# Patient Record
Sex: MALE | Race: WHITE | HISPANIC OR LATINO | Employment: FULL TIME | URBAN - METROPOLITAN AREA
[De-identification: names, ages, dates, MRNs, and addresses within clinical notes are randomized per-mention and may not be internally consistent; named-entity substitution may affect disease eponyms.]

---

## 2018-07-15 ENCOUNTER — APPOINTMENT (OUTPATIENT)
Dept: CT IMAGING | Facility: CLINIC | Age: 23
End: 2018-07-15
Attending: EMERGENCY MEDICINE

## 2018-07-15 ENCOUNTER — HOSPITAL ENCOUNTER (EMERGENCY)
Facility: CLINIC | Age: 23
Discharge: HOME | End: 2018-07-15
Attending: EMERGENCY MEDICINE | Admitting: EMERGENCY MEDICINE

## 2018-07-15 VITALS
TEMPERATURE: 98.4 F | SYSTOLIC BLOOD PRESSURE: 152 MMHG | RESPIRATION RATE: 18 BRPM | OXYGEN SATURATION: 94 % | DIASTOLIC BLOOD PRESSURE: 92 MMHG

## 2018-07-15 DIAGNOSIS — R19.7 DIARRHEA, UNSPECIFIED TYPE: ICD-10-CM

## 2018-07-15 DIAGNOSIS — K52.9 COLITIS: ICD-10-CM

## 2018-07-15 DIAGNOSIS — R10.84 ABDOMINAL PAIN, GENERALIZED: ICD-10-CM

## 2018-07-15 LAB
ALBUMIN SERPL-MCNC: 3.7 G/DL (ref 3.4–5)
ALBUMIN UR-MCNC: NEGATIVE MG/DL
ALP SERPL-CCNC: 92 U/L (ref 40–150)
ALT SERPL W P-5'-P-CCNC: 102 U/L (ref 0–70)
ANION GAP SERPL CALCULATED.3IONS-SCNC: 6 MMOL/L (ref 3–14)
APPEARANCE UR: CLEAR
AST SERPL W P-5'-P-CCNC: 56 U/L (ref 0–45)
BACTERIA #/AREA URNS HPF: ABNORMAL /HPF
BASOPHILS # BLD AUTO: 0 10E9/L (ref 0–0.2)
BASOPHILS NFR BLD AUTO: 0.2 %
BILIRUB SERPL-MCNC: 0.9 MG/DL (ref 0.2–1.3)
BILIRUB UR QL STRIP: NEGATIVE
BUN SERPL-MCNC: 9 MG/DL (ref 7–30)
C COLI+JEJUNI+LARI FUSA STL QL NAA+PROBE: NOT DETECTED
CALCIUM SERPL-MCNC: 8.8 MG/DL (ref 8.5–10.1)
CHLORIDE SERPL-SCNC: 107 MMOL/L (ref 94–109)
CO2 SERPL-SCNC: 26 MMOL/L (ref 20–32)
COLOR UR AUTO: YELLOW
CREAT SERPL-MCNC: 0.89 MG/DL (ref 0.66–1.25)
DIFFERENTIAL METHOD BLD: NORMAL
EC STX1 GENE STL QL NAA+PROBE: NOT DETECTED
EC STX2 GENE STL QL NAA+PROBE: NOT DETECTED
ENTERIC PATHOGEN COMMENT: NORMAL
EOSINOPHIL # BLD AUTO: 0.1 10E9/L (ref 0–0.7)
EOSINOPHIL NFR BLD AUTO: 0.8 %
ERYTHROCYTE [DISTWIDTH] IN BLOOD BY AUTOMATED COUNT: 12.2 % (ref 10–15)
GFR SERPL CREATININE-BSD FRML MDRD: >90 ML/MIN/1.7M2
GLUCOSE SERPL-MCNC: 107 MG/DL (ref 70–99)
GLUCOSE UR STRIP-MCNC: NEGATIVE MG/DL
HCT VFR BLD AUTO: 47.6 % (ref 40–53)
HGB BLD-MCNC: 15.1 G/DL (ref 13.3–17.7)
HGB UR QL STRIP: NEGATIVE
IMM GRANULOCYTES # BLD: 0 10E9/L (ref 0–0.4)
IMM GRANULOCYTES NFR BLD: 0.3 %
KETONES UR STRIP-MCNC: NEGATIVE MG/DL
LEUKOCYTE ESTERASE UR QL STRIP: NEGATIVE
LIPASE SERPL-CCNC: 81 U/L (ref 73–393)
LYMPHOCYTES # BLD AUTO: 1.1 10E9/L (ref 0.8–5.3)
LYMPHOCYTES NFR BLD AUTO: 13.1 %
MCH RBC QN AUTO: 28.8 PG (ref 26.5–33)
MCHC RBC AUTO-ENTMCNC: 31.7 G/DL (ref 31.5–36.5)
MCV RBC AUTO: 91 FL (ref 78–100)
MONOCYTES # BLD AUTO: 0.6 10E9/L (ref 0–1.3)
MONOCYTES NFR BLD AUTO: 7.2 %
MUCOUS THREADS #/AREA URNS LPF: PRESENT /LPF
NEUTROPHILS # BLD AUTO: 6.8 10E9/L (ref 1.6–8.3)
NEUTROPHILS NFR BLD AUTO: 78.4 %
NITRATE UR QL: NEGATIVE
NOROV GI+II ORF1-ORF2 JNC STL QL NAA+PR: NOT DETECTED
NRBC # BLD AUTO: 0 10*3/UL
NRBC BLD AUTO-RTO: 0 /100
PH UR STRIP: 5 PH (ref 5–7)
PLATELET # BLD AUTO: 321 10E9/L (ref 150–450)
POTASSIUM SERPL-SCNC: 3.8 MMOL/L (ref 3.4–5.3)
PROT SERPL-MCNC: 8.2 G/DL (ref 6.8–8.8)
RBC # BLD AUTO: 5.25 10E12/L (ref 4.4–5.9)
RBC #/AREA URNS AUTO: 1 /HPF (ref 0–2)
RVA NSP5 STL QL NAA+PROBE: NOT DETECTED
SALMONELLA SP RPOD STL QL NAA+PROBE: NOT DETECTED
SHIGELLA SP+EIEC IPAH STL QL NAA+PROBE: NOT DETECTED
SODIUM SERPL-SCNC: 139 MMOL/L (ref 133–144)
SOURCE: ABNORMAL
SP GR UR STRIP: 1 (ref 1–1.03)
UROBILINOGEN UR STRIP-MCNC: 0 MG/DL (ref 0–2)
V CHOL+PARA RFBL+TRKH+TNAA STL QL NAA+PR: NOT DETECTED
WBC # BLD AUTO: 8.7 10E9/L (ref 4–11)
WBC #/AREA URNS AUTO: <1 /HPF (ref 0–5)
Y ENTERO RECN STL QL NAA+PROBE: NOT DETECTED

## 2018-07-15 PROCEDURE — 96374 THER/PROPH/DIAG INJ IV PUSH: CPT | Mod: 59

## 2018-07-15 PROCEDURE — 83690 ASSAY OF LIPASE: CPT | Performed by: EMERGENCY MEDICINE

## 2018-07-15 PROCEDURE — 74177 CT ABD & PELVIS W/CONTRAST: CPT

## 2018-07-15 PROCEDURE — 87506 IADNA-DNA/RNA PROBE TQ 6-11: CPT | Performed by: EMERGENCY MEDICINE

## 2018-07-15 PROCEDURE — 96375 TX/PRO/DX INJ NEW DRUG ADDON: CPT

## 2018-07-15 PROCEDURE — 96361 HYDRATE IV INFUSION ADD-ON: CPT

## 2018-07-15 PROCEDURE — 81001 URINALYSIS AUTO W/SCOPE: CPT | Performed by: EMERGENCY MEDICINE

## 2018-07-15 PROCEDURE — 25000128 H RX IP 250 OP 636: Performed by: EMERGENCY MEDICINE

## 2018-07-15 PROCEDURE — 80053 COMPREHEN METABOLIC PANEL: CPT | Performed by: EMERGENCY MEDICINE

## 2018-07-15 PROCEDURE — 85025 COMPLETE CBC W/AUTO DIFF WBC: CPT | Performed by: EMERGENCY MEDICINE

## 2018-07-15 PROCEDURE — 99285 EMERGENCY DEPT VISIT HI MDM: CPT | Mod: 25

## 2018-07-15 RX ORDER — IOPAMIDOL 755 MG/ML
500 INJECTION, SOLUTION INTRAVASCULAR ONCE
Status: COMPLETED | OUTPATIENT
Start: 2018-07-15 | End: 2018-07-15

## 2018-07-15 RX ORDER — ONDANSETRON 4 MG/1
4 TABLET, ORALLY DISINTEGRATING ORAL EVERY 8 HOURS PRN
Qty: 10 TABLET | Refills: 0 | Status: SHIPPED | OUTPATIENT
Start: 2018-07-15 | End: 2018-07-18

## 2018-07-15 RX ORDER — ONDANSETRON 2 MG/ML
4 INJECTION INTRAMUSCULAR; INTRAVENOUS ONCE
Status: COMPLETED | OUTPATIENT
Start: 2018-07-15 | End: 2018-07-15

## 2018-07-15 RX ORDER — OXYCODONE HYDROCHLORIDE 5 MG/1
5 TABLET ORAL EVERY 6 HOURS PRN
Qty: 12 TABLET | Refills: 0 | Status: SHIPPED | OUTPATIENT
Start: 2018-07-15 | End: 2022-09-15

## 2018-07-15 RX ORDER — HYDROMORPHONE HYDROCHLORIDE 1 MG/ML
0.5 INJECTION, SOLUTION INTRAMUSCULAR; INTRAVENOUS; SUBCUTANEOUS ONCE
Status: COMPLETED | OUTPATIENT
Start: 2018-07-15 | End: 2018-07-15

## 2018-07-15 RX ADMIN — ONDANSETRON 4 MG: 2 INJECTION, SOLUTION INTRAMUSCULAR; INTRAVENOUS at 10:50

## 2018-07-15 RX ADMIN — SODIUM CHLORIDE 55 ML: 900 INJECTION, SOLUTION INTRAVENOUS at 11:54

## 2018-07-15 RX ADMIN — IOPAMIDOL 100 ML: 755 INJECTION, SOLUTION INTRAVENOUS at 11:49

## 2018-07-15 RX ADMIN — SODIUM CHLORIDE 1000 ML: 9 INJECTION, SOLUTION INTRAVENOUS at 10:51

## 2018-07-15 RX ADMIN — Medication 0.5 MG: at 10:51

## 2018-07-15 ASSESSMENT — ENCOUNTER SYMPTOMS
HEMATURIA: 0
VOMITING: 0
DYSURIA: 0
DIARRHEA: 1
ABDOMINAL PAIN: 1
BLOOD IN STOOL: 0
NAUSEA: 1

## 2018-07-15 NOTE — ED TRIAGE NOTES
Diarrhea for the past three days about one time an hour. Also abdominal pain that was more yesterday. Chills on the first day of his illness but none since. Did not measure a fever.

## 2018-07-15 NOTE — ED NOTES
Pt states he had BM when giving UA.  States diarrhea was watery - pt drank a lg cup of water, THEN had the urge to have a BM.

## 2018-07-15 NOTE — ED PROVIDER NOTES
History     Chief Complaint:  Nausea, diarrhea, and abdominal pain     HPI   Kar Bower Jr. is a 23 year old male who presents with nausea, diarrhea, and abdominal pain.  Patient says that starting 3 days ago he began having diarrhea every hour, nausea, and abdominal pain.  Says he felt feverish as well but has no known fever.  Here in the ED, patient denies any vomiting, recent camping, recent travel outside of the country, dysuria, hematuria, blood in stool, symptoms like this in the past, recent antibiotic use, history of abdominal surgery, or any pain medications taken prior to arrival today.    Allergies:  Sulfa     Medications:    The patient is currently on no regular medications.      Past Medical History:    The patient denies any significant past medical history.    Past Surgical History:    The patient does not have any pertinent past surgical history  Family / Social History:    No past pertinent family history.     Social History:  Smoking Status: Current Smoker  Alcohol Use: Yes  Patient presents alone.    Review of Systems   Gastrointestinal: Positive for abdominal pain, diarrhea and nausea. Negative for blood in stool and vomiting.   Genitourinary: Negative for dysuria and hematuria.   All other systems reviewed and are negative.      Physical Exam   First Vitals:  BP: (!) 162/105  Heart Rate: 123  Temp: 98.4  F (36.9  C)  Resp: 18  SpO2: 100 %      Physical Exam  Constitutional: The patient is oriented to person, place, and time. Alert and cooperative.  HENT:   Right Ear: External ear normal.   Left Ear: External ear normal.   Nose: Nose normal.   Mouth/Throat: Moist mucous membranes.   Eyes: Conjunctivae, EOM and lids are normal.   Neck: Trachea normal. Normal range of motion. Neck supple.   Cardiovascular: tachycardia, regular rhythm, normal heart sounds, and intact distal pulses.    Pulmonary/Chest: Effort normal and breath sounds equal bilaterally. No crackles or wheezing.   Abdominal: Soft.  Diffuse tenderness to palpation across the lower abdomen. No rebound and no guarding.   Musculoskeletal: Normal range of motion.  No extremity tenderness or edema.  Neurological: Alert and Oriented. Moves all extremities equally.   Skin: Skin is dry. No rash noted.        Emergency Department Course     Imaging:  Radiographic findings were communicated with the patient who voiced understanding of the findings.  CT Abdomen/Pelvis with IV contrast:   1. Findings consistent with pan colitis. No evidence of bowel  obstruction or abscess.  2. Mild mesenteric lymphadenopathy.  3. Fatty liver.   As per radiology.    Laboratory:  Enteric Bacteria and Virus Panel by [facility] stool: In process  UA with micro: bacteria few, mucous urine present o/w negative    CBC: WBC: 8.7, HGB: 15.1, PLT: 321  CMP: Glucose 107 H,  H, AST 56 H, o/w WNL (Creatinine: 0.89)    Lipase: 81    Interventions:  1050 Zofran 4 mg IV  1051 Dilaudid 0.5 mg IV  1051 NS 1L IV    Emergency Department Course:  Nursing notes and vitals reviewed.  I performed an exam of the patient as documented above.     IV inserted. Medicine administered as documented above. Blood drawn. This was sent to the lab for further testing, results above.    The patient was sent for a CT while in the emergency department, findings above.     The patient tolerated PO challenge without difficulty here in the emergency department.     I rechecked the patient and discussed the results of his workup thus far.     Findings and plan explained to the Patient. Patient discharged home with instructions regarding supportive care, medications, and reasons to return. The importance of close follow-up was reviewed. The patient was prescribed Roxicodone and Zofran.    I personally reviewed the laboratory results with the Patient and answered all related questions prior to discharge.     Impression & Plan      Medical Decision Making:  Kar Jaureguidinesh Sue is a 23 year old male who presents with  nausea, diarrhea, and abdominal pain.  Upon presentation in the ED, the patient is nontoxic-appearing.  He is hypertensive and tachycardic, but vitals are otherwise within normal limits and stable.  On exam, he is well-appearing.  He is alert, oriented, and neurologic exam is nonfocal.  Cardiopulmonary exam is unremarkable.  On abdominal examination, he does endorse mild diffuse lower abdominal tenderness to palpation.  There is no rebound or guarding.  The rest of his exam is as mentioned above.    Labs were obtained and are as mentioned above.  Notably, he does not have a leukocytosis.  He does have mildly elevated liver function tests.  He has no tenderness to palpation in the right upper quadrant, therefore I have low suspicion for an acute gallbladder or hepatic pathology.  Therefore, I do not feel that right upper quadrant ultrasound is indicated at this time.  CT of the abdomen was obtained and demonstrates findings consistent with pancolitis.  There is no evidence for bowel obstruction or abscess.  These results were discussed with the patient and he notes understanding.  Given this patient's history and presentation, I do feel his symptoms are most consistent with a viral GI illness.  Given that he is afebrile, denies hematochezia, and does not have a leukocytosis, I have very low suspicion for an invasive bacterial etiology of his symptoms and do not feel that antibiotics are indicated at this time. The patient was able to provide a stool sample while in the emergency department.  The stool culture is in process.  If this is abnormal, patient will be contacted and treated at that time.  Upon my repeat evaluation, he does note improvement in his symptoms and denies any pain at this time.  He was given a trial of PO and tolerated this well.  Given that the patient is well-appearing here and tolerating PO, I do feel he is safe for discharge to home.  I did discuss with the patient that I recommend close  follow-up with a primary care physician and he notes understanding and agreement with this plan.  Symptomatic management was discussed.  Return instructions were given.  He was stable/improved at time discharge.    Diagnosis:    ICD-10-CM    1. Diarrhea, unspecified type R19.7 Enteric Bacteria and Virus Panel by [facility] Stool     UA with Microscopic   2. Colitis K52.9    3. Abdominal pain, generalized R10.84        Disposition:  discharged to home    Discharge Medications:  Discharge Medication List as of 7/15/2018  3:07 PM      START taking these medications    Details   ondansetron (ZOFRAN ODT) 4 MG ODT tab Take 1 tablet (4 mg) by mouth every 8 hours as needed, Disp-10 tablet, R-0, Local Print      oxyCODONE IR (ROXICODONE) 5 MG tablet Take 1 tablet (5 mg) by mouth every 6 hours as needed for pain, Disp-12 tablet, R-0, Local Print               Tai Hayes  7/15/2018   [facility] EMERGENCY DEPARTMENT  ITai, kirill serving as a scribe at 10:12 AM on 7/15/2018 to document services personally performed by Elba Cardoso MD based on my observations and the provider's statements to me.        Elba Cardoso MD  07/15/18 1635       Elba Cardoso MD  07/15/18 1638       Elba Cardoso MD  07/15/18 1637

## 2018-07-15 NOTE — ED AVS SNAPSHOT
[facility] Emergency Department    [address]    [facility] [address]    Phone:  [phone]    Fax:  [phone]                                       Kar Bower Jr.   MRN: [id number]    Department:  [facility] Emergency Department   Date of Visit:  7/15/2018           Patient Information     Date Of Birth          1995        Your diagnoses for this visit were:     Diarrhea, unspecified type     Colitis     Abdominal pain, generalized        You were seen by Elba Cardoso MD.      Follow-up Information     Schedule an appointment as soon as possible for a visit with [facility].    Specialties:  Sports Medicine, Pain Management, Obstetrics & Gynecology, Pediatrics, Internal Medicine, Nephrology    Contact information:    [address] [place] [address]  [facility] [address] [phone]        Discharge Instructions       Please follow up closely with your regular physician.     Please return to the ED if your symptoms worsen or if you develop new or concerning symptoms.     Discharge Instructions  Abdominal Pain    Abdominal pain can be caused by many things. Your evaluation today does not show the exact cause for your pain. Your doctor today has decided that it is unlikely your pain is due to a life threatening problem, or a problem requiring surgery or hospital admission. Sometimes those problems cannot be found right away, so it is very important that you follow up as directed.  Sometimes only the changes which occur over time allow the cause of your pain to be found.    Return to the Emergency Department for a recheck in 8-12 hours if your pain continues.  If your pain gets worse, changes in location, or feels different, return to the Emergency Department right away.    ADULTS:  Return to the Emergency Department right away if:      You get an oral temperature above 102oF or as directed by your doctor.    You have blood  in your stools (bright red or black, tarry stools).    You keep throwing up or can t drink liquids.    You see blood when you throw up.    You can t have a bowel movement or you can t pass gas.    Your stomach gets bloated or bigger.    Your skin or the whites of your eyes look yellow.    You faint.    You have bloody, frequent or painful urination.    You have new symptoms or anything that worries you.    CHILDREN:  Return to the Emergency Department right away if your child has any of the above-listed symptoms or the following:      Pushes your hand away or screams/cries when his/her belly is touched.    You notice your child is very fussy or weak.    Your child is very tired and is too tired to eat or drink.    Your child is dehydrated.  Signs of dehydration can be:  o Your infant has had no wet diapers in 4-5 hours.  o Your older child has not passed urine in 6-8 hours.  o Your infant or child starts to have dry mouth and lips, or no saliva or tears.    PREGNANT WOMEN:  Return to the Emergency Department right away if you have any of the above-listed symptoms or the following:      You have bleeding, leaking fluid or passing tissue from the vagina.    You have worse pain or cramping, or pain in your shoulder or back.    You have vomiting that will not stop.    You have painful or bloody urination.    You have a temperature of 100oF or more.    Your baby is not moving as much as usual.    You faint.    You get a bad headache with or without eye problems and abdominal pain.    You have a convulsion or seizure.    You have unusual discharge from your vagina and abdominal pain.    Abdominal pain is pretty common during pregnancy.  Your pain may or may not be related to your pregnancy. You should follow-up closely with your OB doctor so they can evaluate you and your baby.  Until you follow-up with your regular doctor, do the following:       Avoid sex and do not put anything in your vagina.    Drink clear  "fluids.    Only take medications approved by your doctor.    MORE INFORMATION:    Appendicitis:  A possible cause of abdominal pain in any person who still has their appendix is acute appendicitis. Appendicitis is often hard to diagnose.  Testing does not always rule out early appendicitis or other causes of abdominal pain. Close follow-up with your doctor and re-evaluations may be needed to figure out the reason for your abdominal pain.    Follow-up:  It is very important that you make an appointment with your clinic and go to the appointment.  If you do not follow-up with your primary doctor, it may result in missing an important development which could result in permanent injury or disability and/or lasting pain.  If there is any problem keeping your appointment, call your doctor or return to the Emergency Department.    Medications:  Take your medications as directed by your doctor today.  Before using over-the-counter medications, ask your doctor and make sure to take the medications as directed.  If you have any questions about medications, ask your doctor.    Diet:  Resume your normal diet as much as possible, but do not eat fried, fatty or spicy foods while you have pain.  Do not drink alcohol or have caffeine.  Do not smoke tobacco.    Probiotics: If you have been given an antibiotic, you may want to also take a probiotic pill or eat yogurt with live cultures. Probiotics have \"good bacteria\" to help your intestines stay healthy. Studies have shown that probiotics help prevent diarrhea and other intestine problems (including C. diff infection) when you take antibiotics. You can buy these without a prescription in the pharmacy section of the store.     If you were given a prescription for medicine here today, be sure to read all of the information (including the package insert) that comes with your prescription.  This will include important information about the medicine, its side effects, and any warnings " that you need to know about.  The pharmacist who fills the prescription can provide more information and answer questions you may have about the medicine.  If you have questions or concerns that the pharmacist cannot address, please call or return to the Emergency Department.         Opioid Medication Information    Pain medications are among the most commonly prescribed medicines, so we are including this information for all our patients. If you did not receive pain medication or get a prescription for pain medicine, you can ignore it.     You may have been given a prescription for an opioid (narcotic) pain medicine and/or have received a pain medicine while here in the Emergency Department. These medicines can make you drowsy or impaired. You must not drive, operate dangerous equipment, or engage in any other dangerous activities while taking these medications. If you drive while taking these medications, you could be arrested for DUI, or driving under the influence. Do not drink any alcohol while you are taking these medications.     Opioid pain medications can cause addiction. If you have a history of chemical dependency of any type, you are at a higher risk of becoming addicted to pain medications.  Only take these prescribed medications to treat your pain when all other options have been tried. Take it for as short a time and as few doses as possible. Store your pain pills in a secure place, as they are frequently stolen and provide a dangerous opportunity for children or visitors in your house to start abusing these powerful medications. We will not replace any lost or stolen medicine.  As soon as your pain is better, you should flush all your remaining medication.     Many prescription pain medications contain Tylenol  (acetaminophen), including Vicodin , Tylenol #3 , Norco , Lortab , and Percocet .  You should not take any extra pills of Tylenol  if you are using these prescription medications or you can  get very sick.  Do not ever take more than 3000 mg of acetaminophen in any 24 hour period.    All opioids tend to cause constipation. Drink plenty of water and eat foods that have a lot of fiber, such as fruits, vegetables, prune juice, apple juice and high fiber cereal.  Take a laxative if you don t move your bowels at least every other day. Miralax , Milk of Magnesia, Colace , or Senna  can be used to keep you regular.      Remember that you can always come back to the Emergency Department if you are not able to see your regular doctor in the amount of time listed above, if you get any new symptoms, or if there is anything that worries you.      Discharge Instructions  Adult Diarrhea    You have been seen today for diarrhea. This is usually caused by a virus, but some bacteria, parasites, medicines or other medical conditions can cause similar symptoms. At this time your doctor does not find that your diarrhea is a sign of anything dangerous or life-threatening. However, sometimes the signs of serious illness do not show up right away. If you have new or worse symptoms, you may need to be seen again in the Emergency Department or by your primary doctor.     Return to the Emergency Department if:    You feel you are getting dehydrated, such as being very thirsty, not urinating at least every 8-12 hours, or feeling faint or lightheaded.     You develop a new fever, or your fever continues for more than 2 days.     You have belly pain that seems worse than cramps, is in one spot, or is getting worse over time.     You have blood in your stool or your stool becomes black.  (Remember that if you take Pepto-Bismol , this will turn your stool black).     You feel very weak.    You are not starting to improve within 24 hours of your visit here.    What can I do to help myself?    The most important thing to do is to drink clear liquids.   It is best to have only small, frequent sips of liquids. Drinking too much at once  "may cause more diarrhea. You should also replace minerals, sodium and potassium lost with diarrhea. Pedialyte  and sports drinks can help you replace these minerals. You can also drink clear liquids such as water, weak tea, apple juice, and 7-Up . Avoid acid liquids (orange), caffeine (coffee) or alcohol. Milk products will make the diarrhea worse.     Eat only bland foods. Soda crackers, toast, plain noodles, gelatin, applesauce and bananas are good first choices. Avoid foods that have acid, are spicy, fatty or fibrous (such as meats, coarse grains, vegetables). You may start eating these foods again in about 3 days when you are better.     Sometimes treatment includes prescription medicine to prevent diarrhea. If your doctor prescribes these for you, take them as directed.     Nonprescription medicine is available for the treatment of diarrhea and can be very effective. If you use it, make sure you use the dose recommended on the package. Check with your healthcare provider before you use any medicine for diarrhea.     Don t take ibuprofen, or other nonsteroidal anti-inflammatory medicines without checking with your healthcare provider.   Probiotics: If you have been given an antibiotic, you may want to also take a probiotic pill or eat yogurt with live cultures. Probiotics have \"good bacteria\" to help your intestines stay healthy. Studies have shown that probiotics help prevent diarrhea and other intestine problems (including C. diff infection) when you take antibiotics. You can buy these without a prescription in the pharmacy section of the store.   If you were given a prescription for medicine here today, be sure to read all of the information (including the package insert) that comes with your prescription.  This will include important information about the medicine, its side effects, and any warnings that you need to know about.  The pharmacist who fills the prescription can provide more information and " answer questions you may have about the medicine.  If you have questions or concerns that the pharmacist cannot address, please call or return to the Emergency Department.   Opioid Medication Information    Pain medications are among the most commonly prescribed medicines, so we are including this information for all our patients. If you did not receive pain medication or get a prescription for pain medicine, you can ignore it.     You may have been given a prescription for an opioid (narcotic) pain medicine and/or have received a pain medicine while here in the Emergency Department. These medicines can make you drowsy or impaired. You must not drive, operate dangerous equipment, or engage in any other dangerous activities while taking these medications. If you drive while taking these medications, you could be arrested for DUI, or driving under the influence. Do not drink any alcohol while you are taking these medications.     Opioid pain medications can cause addiction. If you have a history of chemical dependency of any type, you are at a higher risk of becoming addicted to pain medications.  Only take these prescribed medications to treat your pain when all other options have been tried. Take it for as short a time and as few doses as possible. Store your pain pills in a secure place, as they are frequently stolen and provide a dangerous opportunity for children or visitors in your house to start abusing these powerful medications. We will not replace any lost or stolen medicine.  As soon as your pain is better, you should flush all your remaining medication.     Many prescription pain medications contain Tylenol  (acetaminophen), including Vicodin , Tylenol #3 , Norco , Lortab , and Percocet .  You should not take any extra pills of Tylenol  if you are using these prescription medications or you can get very sick.  Do not ever take more than 3000 mg of acetaminophen in any 24 hour period.    All opioids tend to  cause constipation. Drink plenty of water and eat foods that have a lot of fiber, such as fruits, vegetables, prune juice, apple juice and high fiber cereal.  Take a laxative if you don t move your bowels at least every other day. Miralax , Milk of Magnesia, Colace , or Senna  can be used to keep you regular.      Remember that you can always come back to the Emergency Department if you are not able to see your regular doctor in the amount of time listed above, if you get any new symptoms, or if there is anything that worries you.        24 Hour Appointment Hotline       To make an appointment at any [facility], call [phone] ([phone]). If you don't have a family doctor or clinic, we will help you find one. [facility] are conveniently located to serve the needs of you and your family.             Review of your medicines      START taking        Dose / Directions Last dose taken    ondansetron 4 MG ODT tab   Commonly known as:  ZOFRAN ODT   Dose:  4 mg   Quantity:  10 tablet        Take 1 tablet (4 mg) by mouth every 8 hours as needed   Refills:  0        oxyCODONE IR 5 MG tablet   Commonly known as:  ROXICODONE   Dose:  5 mg   Quantity:  12 tablet        Take 1 tablet (5 mg) by mouth every 6 hours as needed for pain   Refills:  0          Our records show that you are taking the medicines listed below. If these are incorrect, please call your family doctor or clinic.        Dose / Directions Last dose taken    benzonatate 200 MG capsule   Commonly known as:  TESSALON   Dose:  200 mg   Quantity:  21 capsule        Take 1 capsule (200 mg) by mouth 3 times daily as needed for cough   Refills:  0                Information about OPIOIDS     PRESCRIPTION OPIOIDS: WHAT YOU NEED TO KNOW   We gave you an opioid (narcotic) pain medicine. It is important to manage your pain, but opioids are not always the best choice. You should first try all the other options your care team gave you. Take this  medicine for as short a time (and as few doses) as possible.     These medicines have risks:    DO NOT drive when on new or higher doses of pain medicine. These medicines can affect your alertness and reaction times, and you could be arrested for driving under the influence (DUI). If you need to use opioids long-term, talk to your care team about driving.    DO NOT operate heave machinery    DO NOT do any other dangerous activities while taking these medicines.     DO NOT drink any alcohol while taking these medicines.      If the opioid prescribed includes acetaminophen, DO NOT take with any other medicines that contain acetaminophen. Read all labels carefully. Look for the word  acetaminophen  or  Tylenol.  Ask your pharmacist if you have questions or are unsure.    You can get addicted to pain medicines, especially if you have a history of addiction (chemical, alcohol or substance dependence). Talk to your care team about ways to reduce this risk.    Store your pills in a secure place, locked if possible. We will not replace any lost or stolen medicine. If you don t finish your medicine, please throw away (dispose) as directed by your pharmacist. The [place] [employer] has more information about safe disposal: https://www.pca.[facility].[place].us/living-green/managing-unwanted-medications.     All opioids tend to cause constipation. Drink plenty of water and eat foods that have a lot of fiber, such as fruits, vegetables, prune juice, apple juice and high-fiber cereal. Take a laxative (Miralax, milk of magnesia, Colace, Senna) if you don t move your bowels at least every other day.         Prescriptions were sent or printed at these locations (2 Prescriptions)                   Other Prescriptions                Printed at Department/Unit printer (2 of 2)         ondansetron (ZOFRAN ODT) 4 MG ODT tab               oxyCODONE IR (ROXICODONE) 5 MG tablet                Procedures and tests performed during  your visit     CBC + differential    CT Abdomen Pelvis w Contrast    Comprehensive metabolic panel    Enteric Bacteria and Virus Panel by THUY Stool    Lipase    UA with Microscopic      Orders Needing Specimen Collection     None      Pending Results     Date and Time Order Name Status Description    7/15/2018 1017 Enteric Bacteria and Virus Panel by THUY Stool In process             Pending Culture Results     Date and Time Order Name Status Description    7/15/2018 1017 Enteric Bacteria and Virus Panel by THUY Stool In process             Pending Results Instructions     If you had any lab results that were not finalized at the time of your Discharge, you can call the ED Lab Result RN at [phone]. You will be contacted by this team for any positive Lab results or changes in treatment. The nurses are available 7 days a week from 10A to 6:30P.  You can leave a message 24 hours per day and they will return your call.        Test Results From Your Hospital Stay        7/15/2018 11:03 AM      Component Results     Component Value Ref Range & Units Status    WBC 8.7 4.0 - 11.0 10e9/L Final    RBC Count 5.25 4.4 - 5.9 10e12/L Final    Hemoglobin 15.1 13.3 - 17.7 g/dL Final    Hematocrit 47.6 40.0 - 53.0 % Final    MCV 91 78 - 100 fl Final    MCH 28.8 26.5 - 33.0 pg Final    MCHC 31.7 31.5 - 36.5 g/dL Final    RDW 12.2 10.0 - 15.0 % Final    Platelet Count 321 150 - 450 10e9/L Final    Diff Method Automated Method  Final    % Neutrophils 78.4 % Final    % Lymphocytes 13.1 % Final    % Monocytes 7.2 % Final    % Eosinophils 0.8 % Final    % Basophils 0.2 % Final    % Immature Granulocytes 0.3 % Final    Nucleated RBCs 0 0 /100 Final    Absolute Neutrophil 6.8 1.6 - 8.3 10e9/L Final    Absolute Lymphocytes 1.1 0.8 - 5.3 10e9/L Final    Absolute Monocytes 0.6 0.0 - 1.3 10e9/L Final    Absolute Eosinophils 0.1 0.0 - 0.7 10e9/L Final    Absolute Basophils 0.0 0.0 - 0.2 10e9/L Final    Abs Immature Granulocytes 0.0 0 - 0.4  10e9/L Final    Absolute Nucleated RBC 0.0  Final         7/15/2018 11:22 AM      Component Results     Component Value Ref Range & Units Status    Sodium 139 133 - 144 mmol/L Final    Potassium 3.8 3.4 - 5.3 mmol/L Final    Chloride 107 94 - 109 mmol/L Final    Carbon Dioxide 26 20 - 32 mmol/L Final    Anion Gap 6 3 - 14 mmol/L Final    Glucose 107 (H) 70 - 99 mg/dL Final    Urea Nitrogen 9 7 - 30 mg/dL Final    Creatinine 0.89 0.66 - 1.25 mg/dL Final    GFR Estimate >90 >60 mL/min/1.7m2 Final    Non  GFR Calc    GFR Estimate If Black >90 >60 mL/min/1.7m2 Final    African American GFR Calc    Calcium 8.8 8.5 - 10.1 mg/dL Final    Bilirubin Total 0.9 0.2 - 1.3 mg/dL Final    Albumin 3.7 3.4 - 5.0 g/dL Final    Protein Total 8.2 6.8 - 8.8 g/dL Final    Alkaline Phosphatase 92 40 - 150 U/L Final     (H) 0 - 70 U/L Final    AST 56 (H) 0 - 45 U/L Final         7/15/2018 11:22 AM      Component Results     Component Value Ref Range & Units Status    Lipase 81 73 - 393 U/L Final         7/15/2018  3:03 PM         7/15/2018 12:31 PM      Narrative     CT ABDOMEN AND PELVIS WITH CONTRAST   7/15/2018 11:59 AM     HISTORY: Diffuse lower abdominal pain, diarrhea.     TECHNIQUE: 100 mL Isovue-370. Radiation dose for this scan was reduced  using automated exposure control, adjustment of the mA and/or kV  according to patient size, or iterative reconstruction technique.    COMPARISON: None.    FINDINGS: The lung bases are clear. There is diffuse fatty  infiltration of the liver. The spleen, pancreas, adrenal glands, and  kidneys are unremarkable. There are multiple mildly enlarged  mesenteric lymph nodes. There is mild diffuse colonic wall thickening.  The appendix is normal. Small bowel is normal in caliber. There is no  free intraperitoneal air. No ascites or fluid collections.         Impression     IMPRESSION:   1. Findings consistent with pan colitis. No evidence of bowel  obstruction or abscess.  2.  Mild mesenteric lymphadenopathy.  3. Fatty liver.    ALBA COLES MD         7/15/2018  2:40 PM      Component Results     Component Value Ref Range & Units Status    Color Urine Yellow  Final    Appearance Urine Clear  Final    Glucose Urine Negative NEG^Negative mg/dL Final    Bilirubin Urine Negative NEG^Negative Final    Ketones Urine Negative NEG^Negative mg/dL Final    Specific Gravity Urine 1.005 1.003 - 1.035 Final    Blood Urine Negative NEG^Negative Final    pH Urine 5.0 5.0 - 7.0 pH Final    Protein Albumin Urine Negative NEG^Negative mg/dL Final    Urobilinogen mg/dL 0.0 0.0 - 2.0 mg/dL Final    Nitrite Urine Negative NEG^Negative Final    Leukocyte Esterase Urine Negative NEG^Negative Final    Source Midstream Urine  Final    WBC Urine <1 0 - 5 /HPF Final    RBC Urine 1 0 - 2 /HPF Final    Bacteria Urine Few (A) NEG^Negative /HPF Final    Mucous Urine Present (A) NEG^Negative /LPF Final                Clinical Quality Measure: Blood Pressure Screening     Your blood pressure was checked while you were in the emergency department today. The last reading we obtained was  BP: (!) 152/92 . Please read the guidelines below about what these numbers mean and what you should do about them.  If your systolic blood pressure (the top number) is less than 120 and your diastolic blood pressure (the bottom number) is less than 80, then your blood pressure is normal. There is nothing more that you need to do about it.  If your systolic blood pressure (the top number) is 120-139 or your diastolic blood pressure (the bottom number) is 80-89, your blood pressure may be higher than it should be. You should have your blood pressure rechecked within a year by a primary care provider.  If your systolic blood pressure (the top number) is 140 or greater or your diastolic blood pressure (the bottom number) is 90 or greater, you may have high blood pressure. High blood pressure is treatable, but if left untreated over time it  "can put you at risk for heart attack, stroke, or kidney failure. You should have your blood pressure rechecked by a primary care provider within the next 4 weeks.  If your provider in the emergency department today gave you specific instructions to follow-up with your doctor or provider even sooner than that, you should follow that instruction and not wait for up to 4 weeks for your follow-up visit.        Thank you for choosing [facility]       Thank you for choosing [facility] for your care. Our goal is always to provide you with excellent care. Hearing back from our patients is one way we can continue to improve our services. Please take a few minutes to complete the written survey that you may receive in the mail after you visit with us. Thank you!        [employer] Information     [email or web address] lets you send messages to your doctor, view your test results, renew your prescriptions, schedule appointments and more. To sign up, go to [email or web address] . Click on \"Log in\" on the left side of the screen, which will take you to the Welcome page. Then click on \"Sign up Now\" on the right side of the page.     You will be asked to enter the access code listed below, as well as some personal information. Please follow the directions to create your username and password.     Your access code is: [email or web address]  Expires: 10/13/2018  3:06 PM     Your access code will  in 90 days. If you need help or a new code, please call your [facility] clinic or [phone].        Care EveryWhere ID     This is your Care EveryWhere ID. This could be used by other organizations to access your [facility] medical records  [id number]        Equal Access to Services     [facility] : Hadii meseret Echavarria, wacarlosda luqlauren, qaybta kaalkevin irby . So [facility] [phone].    ATENCIÓN: Si habla español, tiene a jean baptiste disposición servicios gratuitos de asistencia lingüística. Llame al " [phone].    We comply with applicable federal civil rights laws and [place] laws. We do not discriminate on the basis of race, color, national origin, age, disability, sex, sexual orientation, or gender identity.            After Visit Summary       This is your record. Keep this with you and show to your community pharmacist(s) and doctor(s) at your next visit.

## 2018-07-15 NOTE — DISCHARGE INSTRUCTIONS
Please follow up closely with your regular physician.     Please return to the ED if your symptoms worsen or if you develop new or concerning symptoms.     Discharge Instructions  Abdominal Pain    Abdominal pain can be caused by many things. Your evaluation today does not show the exact cause for your pain. Your doctor today has decided that it is unlikely your pain is due to a life threatening problem, or a problem requiring surgery or hospital admission. Sometimes those problems cannot be found right away, so it is very important that you follow up as directed.  Sometimes only the changes which occur over time allow the cause of your pain to be found.    Return to the Emergency Department for a recheck in 8-12 hours if your pain continues.  If your pain gets worse, changes in location, or feels different, return to the Emergency Department right away.    ADULTS:  Return to the Emergency Department right away if:      You get an oral temperature above 102oF or as directed by your doctor.    You have blood in your stools (bright red or black, tarry stools).    You keep throwing up or can t drink liquids.    You see blood when you throw up.    You can t have a bowel movement or you can t pass gas.    Your stomach gets bloated or bigger.    Your skin or the whites of your eyes look yellow.    You faint.    You have bloody, frequent or painful urination.    You have new symptoms or anything that worries you.    CHILDREN:  Return to the Emergency Department right away if your child has any of the above-listed symptoms or the following:      Pushes your hand away or screams/cries when his/her belly is touched.    You notice your child is very fussy or weak.    Your child is very tired and is too tired to eat or drink.    Your child is dehydrated.  Signs of dehydration can be:  o Your infant has had no wet diapers in 4-5 hours.  o Your older child has not passed urine in 6-8 hours.  o Your infant or child starts to have  dry mouth and lips, or no saliva or tears.    PREGNANT WOMEN:  Return to the Emergency Department right away if you have any of the above-listed symptoms or the following:      You have bleeding, leaking fluid or passing tissue from the vagina.    You have worse pain or cramping, or pain in your shoulder or back.    You have vomiting that will not stop.    You have painful or bloody urination.    You have a temperature of 100oF or more.    Your baby is not moving as much as usual.    You faint.    You get a bad headache with or without eye problems and abdominal pain.    You have a convulsion or seizure.    You have unusual discharge from your vagina and abdominal pain.    Abdominal pain is pretty common during pregnancy.  Your pain may or may not be related to your pregnancy. You should follow-up closely with your OB doctor so they can evaluate you and your baby.  Until you follow-up with your regular doctor, do the following:       Avoid sex and do not put anything in your vagina.    Drink clear fluids.    Only take medications approved by your doctor.    MORE INFORMATION:    Appendicitis:  A possible cause of abdominal pain in any person who still has their appendix is acute appendicitis. Appendicitis is often hard to diagnose.  Testing does not always rule out early appendicitis or other causes of abdominal pain. Close follow-up with your doctor and re-evaluations may be needed to figure out the reason for your abdominal pain.    Follow-up:  It is very important that you make an appointment with your clinic and go to the appointment.  If you do not follow-up with your primary doctor, it may result in missing an important development which could result in permanent injury or disability and/or lasting pain.  If there is any problem keeping your appointment, call your doctor or return to the Emergency Department.    Medications:  Take your medications as directed by your doctor today.  Before using over-the-counter  "medications, ask your doctor and make sure to take the medications as directed.  If you have any questions about medications, ask your doctor.    Diet:  Resume your normal diet as much as possible, but do not eat fried, fatty or spicy foods while you have pain.  Do not drink alcohol or have caffeine.  Do not smoke tobacco.    Probiotics: If you have been given an antibiotic, you may want to also take a probiotic pill or eat yogurt with live cultures. Probiotics have \"good bacteria\" to help your intestines stay healthy. Studies have shown that probiotics help prevent diarrhea and other intestine problems (including C. diff infection) when you take antibiotics. You can buy these without a prescription in the pharmacy section of the store.     If you were given a prescription for medicine here today, be sure to read all of the information (including the package insert) that comes with your prescription.  This will include important information about the medicine, its side effects, and any warnings that you need to know about.  The pharmacist who fills the prescription can provide more information and answer questions you may have about the medicine.  If you have questions or concerns that the pharmacist cannot address, please call or return to the Emergency Department.         Opioid Medication Information    Pain medications are among the most commonly prescribed medicines, so we are including this information for all our patients. If you did not receive pain medication or get a prescription for pain medicine, you can ignore it.     You may have been given a prescription for an opioid (narcotic) pain medicine and/or have received a pain medicine while here in the Emergency Department. These medicines can make you drowsy or impaired. You must not drive, operate dangerous equipment, or engage in any other dangerous activities while taking these medications. If you drive while taking these medications, you could be " arrested for DUI, or driving under the influence. Do not drink any alcohol while you are taking these medications.     Opioid pain medications can cause addiction. If you have a history of chemical dependency of any type, you are at a higher risk of becoming addicted to pain medications.  Only take these prescribed medications to treat your pain when all other options have been tried. Take it for as short a time and as few doses as possible. Store your pain pills in a secure place, as they are frequently stolen and provide a dangerous opportunity for children or visitors in your house to start abusing these powerful medications. We will not replace any lost or stolen medicine.  As soon as your pain is better, you should flush all your remaining medication.     Many prescription pain medications contain Tylenol  (acetaminophen), including Vicodin , Tylenol #3 , Norco , Lortab , and Percocet .  You should not take any extra pills of Tylenol  if you are using these prescription medications or you can get very sick.  Do not ever take more than 3000 mg of acetaminophen in any 24 hour period.    All opioids tend to cause constipation. Drink plenty of water and eat foods that have a lot of fiber, such as fruits, vegetables, prune juice, apple juice and high fiber cereal.  Take a laxative if you don t move your bowels at least every other day. Miralax , Milk of Magnesia, Colace , or Senna  can be used to keep you regular.      Remember that you can always come back to the Emergency Department if you are not able to see your regular doctor in the amount of time listed above, if you get any new symptoms, or if there is anything that worries you.      Discharge Instructions  Adult Diarrhea    You have been seen today for diarrhea. This is usually caused by a virus, but some bacteria, parasites, medicines or other medical conditions can cause similar symptoms. At this time your doctor does not find that your diarrhea is a sign  of anything dangerous or life-threatening. However, sometimes the signs of serious illness do not show up right away. If you have new or worse symptoms, you may need to be seen again in the Emergency Department or by your primary doctor.     Return to the Emergency Department if:    You feel you are getting dehydrated, such as being very thirsty, not urinating at least every 8-12 hours, or feeling faint or lightheaded.     You develop a new fever, or your fever continues for more than 2 days.     You have belly pain that seems worse than cramps, is in one spot, or is getting worse over time.     You have blood in your stool or your stool becomes black.  (Remember that if you take Pepto-Bismol , this will turn your stool black).     You feel very weak.    You are not starting to improve within 24 hours of your visit here.    What can I do to help myself?    The most important thing to do is to drink clear liquids.   It is best to have only small, frequent sips of liquids. Drinking too much at once may cause more diarrhea. You should also replace minerals, sodium and potassium lost with diarrhea. Pedialyte  and sports drinks can help you replace these minerals. You can also drink clear liquids such as water, weak tea, apple juice, and 7-Up . Avoid acid liquids (orange), caffeine (coffee) or alcohol. Milk products will make the diarrhea worse.     Eat only bland foods. Soda crackers, toast, plain noodles, gelatin, applesauce and bananas are good first choices. Avoid foods that have acid, are spicy, fatty or fibrous (such as meats, coarse grains, vegetables). You may start eating these foods again in about 3 days when you are better.     Sometimes treatment includes prescription medicine to prevent diarrhea. If your doctor prescribes these for you, take them as directed.     Nonprescription medicine is available for the treatment of diarrhea and can be very effective. If you use it, make sure you use the dose  "recommended on the package. Check with your healthcare provider before you use any medicine for diarrhea.     Don t take ibuprofen, or other nonsteroidal anti-inflammatory medicines without checking with your healthcare provider.   Probiotics: If you have been given an antibiotic, you may want to also take a probiotic pill or eat yogurt with live cultures. Probiotics have \"good bacteria\" to help your intestines stay healthy. Studies have shown that probiotics help prevent diarrhea and other intestine problems (including C. diff infection) when you take antibiotics. You can buy these without a prescription in the pharmacy section of the store.   If you were given a prescription for medicine here today, be sure to read all of the information (including the package insert) that comes with your prescription.  This will include important information about the medicine, its side effects, and any warnings that you need to know about.  The pharmacist who fills the prescription can provide more information and answer questions you may have about the medicine.  If you have questions or concerns that the pharmacist cannot address, please call or return to the Emergency Department.   Opioid Medication Information    Pain medications are among the most commonly prescribed medicines, so we are including this information for all our patients. If you did not receive pain medication or get a prescription for pain medicine, you can ignore it.     You may have been given a prescription for an opioid (narcotic) pain medicine and/or have received a pain medicine while here in the Emergency Department. These medicines can make you drowsy or impaired. You must not drive, operate dangerous equipment, or engage in any other dangerous activities while taking these medications. If you drive while taking these medications, you could be arrested for DUI, or driving under the influence. Do not drink any alcohol while you are taking these " medications.     Opioid pain medications can cause addiction. If you have a history of chemical dependency of any type, you are at a higher risk of becoming addicted to pain medications.  Only take these prescribed medications to treat your pain when all other options have been tried. Take it for as short a time and as few doses as possible. Store your pain pills in a secure place, as they are frequently stolen and provide a dangerous opportunity for children or visitors in your house to start abusing these powerful medications. We will not replace any lost or stolen medicine.  As soon as your pain is better, you should flush all your remaining medication.     Many prescription pain medications contain Tylenol  (acetaminophen), including Vicodin , Tylenol #3 , Norco , Lortab , and Percocet .  You should not take any extra pills of Tylenol  if you are using these prescription medications or you can get very sick.  Do not ever take more than 3000 mg of acetaminophen in any 24 hour period.    All opioids tend to cause constipation. Drink plenty of water and eat foods that have a lot of fiber, such as fruits, vegetables, prune juice, apple juice and high fiber cereal.  Take a laxative if you don t move your bowels at least every other day. Miralax , Milk of Magnesia, Colace , or Senna  can be used to keep you regular.      Remember that you can always come back to the Emergency Department if you are not able to see your regular doctor in the amount of time listed above, if you get any new symptoms, or if there is anything that worries you.

## 2018-07-15 NOTE — ED AVS SNAPSHOT
[facility] Department    [address]    [facility] [address]    Phone:  [phone]    Fax:  [phone]                                       Kar Bower Jr.   MRN: [id number]    Department:  [facility]   Date of Visit:  7/15/2018           After Visit Summary Signature Page     I have received my discharge instructions, and my questions have been answered. I have discussed any challenges I see with this plan with the nurse or doctor.    ..........................................................................................................................................  Patient/Patient Representative Signature      ..........................................................................................................................................  Patient Representative Print Name and Relationship to Patient    ..................................................               ................................................  Date                                            Time    ..........................................................................................................................................  Reviewed by Signature/Title    ...................................................              ..............................................  Date                                                            Time

## 2019-04-15 ENCOUNTER — HOSPITAL ENCOUNTER (EMERGENCY)
Facility: CLINIC | Age: 24
Discharge: HOME | End: 2019-04-15
Attending: EMERGENCY MEDICINE | Admitting: EMERGENCY MEDICINE

## 2019-04-15 ENCOUNTER — APPOINTMENT (OUTPATIENT)
Dept: GENERAL RADIOLOGY | Facility: CLINIC | Age: 24
End: 2019-04-15
Attending: EMERGENCY MEDICINE

## 2019-04-15 VITALS
DIASTOLIC BLOOD PRESSURE: 100 MMHG | TEMPERATURE: 97.8 F | OXYGEN SATURATION: 96 % | SYSTOLIC BLOOD PRESSURE: 144 MMHG | RESPIRATION RATE: 20 BRPM | BODY MASS INDEX: 46.03 KG/M2 | WEIGHT: 315 LBS | HEART RATE: 102 BPM

## 2019-04-15 DIAGNOSIS — J06.9 ACUTE URI: ICD-10-CM

## 2019-04-15 PROCEDURE — 71046 X-RAY EXAM CHEST 2 VIEWS: CPT

## 2019-04-15 PROCEDURE — 99282 EMERGENCY DEPT VISIT SF MDM: CPT

## 2019-04-15 RX ORDER — DOXYCYCLINE 100 MG/1
100 CAPSULE ORAL 2 TIMES DAILY
Qty: 14 CAPSULE | Refills: 0 | Status: SHIPPED | OUTPATIENT
Start: 2019-04-15 | End: 2019-04-22

## 2019-04-15 RX ORDER — CODEINE PHOSPHATE AND GUAIFENESIN 10; 100 MG/5ML; MG/5ML
2 SOLUTION ORAL EVERY 4 HOURS PRN
Qty: 120 ML | Refills: 0 | Status: SHIPPED | OUTPATIENT
Start: 2019-04-15 | End: 2022-09-15

## 2019-04-15 ASSESSMENT — ENCOUNTER SYMPTOMS
COUGH: 1
CHILLS: 0
WHEEZING: 1
FEVER: 0
SORE THROAT: 1
CHEST TIGHTNESS: 1

## 2019-04-15 NOTE — DISCHARGE INSTRUCTIONS
Start doxycycline in 2-3 days if not improving.    Discharge Instructions  Upper Respiratory Infection    The upper respiratory tract includes the sinuses, nasal passages, pharynx, and larynx. A URI, or upper respiratory infection, is an infection of any of the parts of the upper airway. Symptoms include runny nose, congestion, sneezing, sore throat, cough, and fever. URIs are almost always caused by a virus. Antibiotics do not help with viral infections, so are generally not prescribed. A URI is very contagious through coughing and nasal secretions; make sure you wash your hands often and clean surfaces after sneezing, coughing or touching them. While you should start to improve in 3 - 5 days, remember that sometimes a cough can linger for several weeks.    Generally, every Emergency Department visit should have a follow-up clinic visit with either a primary or a specialty clinic/provider. Please follow-up as instructed by your emergency provider today.    Return to the Emergency Department if:  Any of your symptoms get much worse.  You seem very sick, like being too weak to get up.  You have chest pain or shortness of breath.   You have a severe headache.  You are vomiting (throwing up) so much you cannot keep fluids or medicines down.  You have confusion or seem unusually drowsy.  You have a seizure.    What can I do to help myself?  Fill any prescriptions the provider gave you and take them right away  If you have a fever, get plenty of rest and drink lots of fluids, especially water.  Using a humidifier or saline nose spray will also help loosen mucous.   Clothes or blankets will not change your fever. Do what is comfortable for you.  Bathing or sponging in lukewarm water may help you feel better.  Acetaminophen (Tylenol ) or ibuprofen (Advil , Motrin ) will help bring fever down and may help you feel more comfortable. Be sure to read and follow the package directions, and ask your provider if you have  questions.  Do not drink alcohol.  Decongestants may help you feel better. You may use decongestant nose sprays Afrin  (oxymetazoline) or Castro-Synephrine  (phenylephrine hydrochloride) for up to 3 days, or may use a decongestant tablet like Sudafed  (pseudoephedrine).  If you were given a prescription for medicine here today, be sure to read all of the information (including the package insert) that comes with your prescription.  This will include important information about the medicine, its side effects, and any warnings that you need to know about.  The pharmacist who fills the prescription can provide more information and answer questions you may have about the medicine.  If you have questions or concerns that the pharmacist cannot address, please call or return to the Emergency Department.   Remember that you can always come back to the Emergency Department if you are not able to see your regular provider in the amount of time listed above, if you get any new symptoms, or if there is anything that worries you.

## 2019-04-15 NOTE — ED TRIAGE NOTES
Patient reports cough, stuffy nose, sore throat, chills, fatigue and pain in chest when he coughs. Symptoms started 4-5 days ago.

## 2019-04-15 NOTE — ED AVS SNAPSHOT
[facility] Emergency Department  [address]  [facility] [address]  Phone:  [phone]  Fax:  [phone]                                    Kar Bower Jr.   MRN: [id number]    Department:  [facility] Emergency Department   Date of Visit:  4/15/2019           After Visit Summary Signature Page    I have received my discharge instructions, and my questions have been answered. I have discussed any challenges I see with this plan with the nurse or doctor.    ..........................................................................................................................................  Patient/Patient Representative Signature      ..........................................................................................................................................  Patient Representative Print Name and Relationship to Patient    ..................................................               ................................................  Date                                   Time    ..........................................................................................................................................  Reviewed by Signature/Title    ...................................................              ..............................................  Date                                               Time          22EPIC Rev 08/18

## 2019-04-15 NOTE — ED PROVIDER NOTES
History     Chief Complaint:  Cough     HPI   Kar Bower Jr. is a 24 year old male who presents with cough. The patient notes he has been sick for 4-5 days and has been getting progressively worse. The patient notes his symptoms started with a cough that has become unproductive. He notes that his cough is painful after a while. He also notes mild wheezing and sore throat. The patient notes the cough and chest tightness seems to get worse at night when he lays down. The patient denies rash, fever, or chills. The patient notes he has had childhood asthma as well as pneumonia. The patient note this does feel somewhat similar to his past pneumonia.     Allergies:  Sulfa drugs       Medications:    Tessalon     Past Medical History:    The patient denies any significant past medical history.    Past Surgical History:    The patient does not have any pertinent past surgical history.    Family History:    No past pertinent family history.    Social History:  Smoking Status: current some day smoker  Alcohol Use: yes  Marital Status:  Single [1]     Review of Systems   Constitutional: Negative for chills and fever.   HENT: Positive for sore throat.    Respiratory: Positive for cough, chest tightness and wheezing.    Skin: Negative for rash.   All other systems reviewed and are negative.    Physical Exam     Patient Vitals for the past 24 hrs:   BP Temp Temp src Pulse Heart Rate Resp SpO2 Weight   04/15/19 1321 (!) 144/100 -- -- 102 -- 20 96 % --   04/15/19 1248 (!) 135/103 97.8  F (36.6  C) Temporal 112 112 16 97 % 149.7 kg (330 lb)     Physical Exam  Constitutional: Alert, attentive  HENT:     Nose: Nose normal.   Mouth/Throat: Oropharynx is clear, mucous membranes are moist   Ears: Normal external ears. TMs clear bilaterally, normal external canals bilaterally.  Eyes: EOM are normal.    CV: Regular rate and rhythm, no murmurs, rubs or gallups.  Chest: Effort normal and breath sounds normal.   GI: No distension. There  is no tenderness.  MSK: Normal range of motion.   Neurological: Alert, attentive  Skin: Skin is warm and dry.      Emergency Department Course   Imaging:  Radiology findings were communicated with the patient who voiced understanding of the findings.    XR Chest   Negative exam  RONAK ARRIETA MD  Reading per radiology    Emergency Department Course:  Nursing notes and vitals reviewed.    1255 I performed an exam of the patient as documented above.     1305 The patient was sent for a Chest XR while in the emergency department, results above.     1330 I personally reviewed the imaging results with the patient and answered all related questions prior to discharge.    Impression & Plan      Medical Decision Making:  This is a very pleasant 24 year old male with history of PNA in '16 who presents with history and exam consistent with acute upper respiratory infection, albeit with occasional chest pain associated with cough and overall symptoms somewhat similar to his previous PNA. Given the above, and that symptoms are increasing at day 5, CXR was performed. This is fortunately negative.  There is no evidence of acute otitis media.  Symptoms are entirely consistent with infectious process, I strongly doubt PE. Tachycardia is improved without intervention. The patient is well-hydrated, well-appearing, and I believe is safe for discharge with plan for supportive care.  Will prescribe doxycyline to start in 2-3 days if symptoms are not improving, as this could represent bacterial bronchitis.  The patient may take Tylenol or ibuprofen for pain and fever, and I discussed supportive measures such as decongestants.  Return if increasing pain, fever, difficulty breathing, vomiting, or any other concerns.  Follow-up with primary physician in 3-5 days.    Diagnosis:    ICD-10-CM    1. Acute URI J06.9       Disposition:   The patient is discharged to home.    Discharge Medications:     START taking      Dose / Directions    doxycycline hyclate 100 MG capsule  Commonly known as:  VIBRAMYCIN      Dose:  100 mg  Take 1 capsule (100 mg) by mouth 2 times daily for 7 days  Quantity:  14 capsule  Refills:  0     guaiFENesin-codeine 100-10 MG/5ML solution  Commonly known as:  ROBITUSSIN AC      Dose:  2 tsp.  Take 10 mLs by mouth every 4 hours as needed for cough  Quantity:  120 mL  Refills:  0           Where to get your medicines      Some of these will need a paper prescription and others can be bought over the counter. Ask your nurse if you have questions.    Bring a paper prescription for each of these medications    doxycycline hyclate 100 MG capsule    guaiFENesin-codeine 100-10 MG/5ML solution         Scribe Disclosure:  I, Tamika Sweet, am serving as a scribe at 12:57 PM on 4/15/2019 to document services personally performed by Camden Taylor MD based on my observations and the provider's statements to me.  [facility] EMERGENCY DEPARTMENT       Camden Taylor MD  04/15/19 [id number]

## 2020-05-05 ENCOUNTER — HOSPITAL ENCOUNTER (EMERGENCY)
Facility: CLINIC | Age: 25
Discharge: HOME | End: 2020-05-05
Attending: EMERGENCY MEDICINE | Admitting: EMERGENCY MEDICINE

## 2020-05-05 VITALS
HEIGHT: 71 IN | OXYGEN SATURATION: 99 % | TEMPERATURE: 97 F | RESPIRATION RATE: 18 BRPM | SYSTOLIC BLOOD PRESSURE: 139 MMHG | WEIGHT: 315 LBS | HEART RATE: 108 BPM | DIASTOLIC BLOOD PRESSURE: 76 MMHG | BODY MASS INDEX: 44.1 KG/M2

## 2020-05-05 DIAGNOSIS — H61.22 IMPACTED CERUMEN OF LEFT EAR: ICD-10-CM

## 2020-05-05 DIAGNOSIS — R03.0 ELEVATED BLOOD PRESSURE READING WITHOUT DIAGNOSIS OF HYPERTENSION: ICD-10-CM

## 2020-05-05 DIAGNOSIS — R42 VERTIGO: ICD-10-CM

## 2020-05-05 LAB
ANION GAP SERPL CALCULATED.3IONS-SCNC: 3 MMOL/L (ref 3–14)
BUN SERPL-MCNC: 14 MG/DL (ref 7–30)
CALCIUM SERPL-MCNC: 9.2 MG/DL (ref 8.5–10.1)
CHLORIDE SERPL-SCNC: 104 MMOL/L (ref 94–109)
CO2 SERPL-SCNC: 31 MMOL/L (ref 20–32)
CREAT SERPL-MCNC: 0.94 MG/DL (ref 0.66–1.25)
ERYTHROCYTE [DISTWIDTH] IN BLOOD BY AUTOMATED COUNT: 13.1 % (ref 10–15)
GFR SERPL CREATININE-BSD FRML MDRD: >90 ML/MIN/{1.73_M2}
GLUCOSE SERPL-MCNC: 99 MG/DL (ref 70–99)
HCT VFR BLD AUTO: 45.9 % (ref 40–53)
HGB BLD-MCNC: 14.1 G/DL (ref 13.3–17.7)
MCH RBC QN AUTO: 28.4 PG (ref 26.5–33)
MCHC RBC AUTO-ENTMCNC: 30.7 G/DL (ref 31.5–36.5)
MCV RBC AUTO: 92 FL (ref 78–100)
PLATELET # BLD AUTO: 352 10E9/L (ref 150–450)
POTASSIUM SERPL-SCNC: 3.8 MMOL/L (ref 3.4–5.3)
RBC # BLD AUTO: 4.97 10E12/L (ref 4.4–5.9)
SODIUM SERPL-SCNC: 138 MMOL/L (ref 133–144)
WBC # BLD AUTO: 10.9 10E9/L (ref 4–11)

## 2020-05-05 PROCEDURE — 99284 EMERGENCY DEPT VISIT MOD MDM: CPT

## 2020-05-05 PROCEDURE — 93005 ELECTROCARDIOGRAM TRACING: CPT

## 2020-05-05 PROCEDURE — 85027 COMPLETE CBC AUTOMATED: CPT | Performed by: EMERGENCY MEDICINE

## 2020-05-05 PROCEDURE — 69209 REMOVE IMPACTED EAR WAX UNI: CPT | Mod: LT

## 2020-05-05 PROCEDURE — 80048 BASIC METABOLIC PNL TOTAL CA: CPT | Performed by: EMERGENCY MEDICINE

## 2020-05-05 ASSESSMENT — ENCOUNTER SYMPTOMS
SHORTNESS OF BREATH: 0
FEVER: 0
VOMITING: 0
DIZZINESS: 1
WEAKNESS: 0
NUMBNESS: 0

## 2020-05-05 ASSESSMENT — MIFFLIN-ST. JEOR: SCORE: 2535.13

## 2020-05-05 NOTE — ED AVS SNAPSHOT
[facility] Emergency Department  [address]  [facility] [address]  Phone:  [phone]  Fax:  [phone]                                    Kar Bower Jr.   MRN: [id number]    Department:  [facility] Emergency Department   Date of Visit:  5/5/2020           After Visit Summary Signature Page    I have received my discharge instructions, and my questions have been answered. I have discussed any challenges I see with this plan with the nurse or doctor.    ..........................................................................................................................................  Patient/Patient Representative Signature      ..........................................................................................................................................  Patient Representative Print Name and Relationship to Patient    ..................................................               ................................................  Date                                   Time    ..........................................................................................................................................  Reviewed by Signature/Title    ...................................................              ..............................................  Date                                               Time          22EPIC Rev 08/18

## 2020-05-06 LAB — INTERPRETATION ECG - MUSE: NORMAL

## 2020-05-06 NOTE — ED PROVIDER NOTES
"  History     Chief Complaint:  Dizziness       HPI   Kar Bower Jr. is a 25 year old male who presents with dizziness.  1 week ago the patient noted decreased hearing and a fullness in his left ear.  He was concerned that there may be a cerumen impaction.  Over the last 5 days, he has now noticed dizziness described as a mild room spinning sensation.  It is worse with movement particularly with position change or with exertion.  There is mild tinnitus.  He denies any numbness, weakness, nausea or vomiting.  He has no history of recent head or neck trauma.  No recent chiropractor manipulation of the neck..    Allergies:  Sulfa Drugs     Medications:    None    Past Medical History:    None    Past Surgical History:    None    Family History:    Family history reviewed and non-contributory    Social History:   reports that he has been smoking. He does not have any smokeless tobacco history on file. He reports current alcohol use.    PCP: No Ref-Primary, Physician     Review of Systems   Constitutional: Negative for fever.   Respiratory: Negative for shortness of breath.    Gastrointestinal: Negative for vomiting.   Neurological: Positive for dizziness. Negative for weakness and numbness.   All other systems reviewed and are negative.        Physical Exam     Patient Vitals for the past 24 hrs:   BP Temp Temp src Pulse Heart Rate Resp SpO2 Height Weight   05/05/20 2300 139/76 -- -- 108 93 -- 99 % -- --   05/05/20 2245 (!) 135/97 -- -- 103 108 -- 99 % -- --   05/05/20 2230 (!) 172/99 -- -- 104 101 -- 100 % -- --   05/05/20 2215 (!) 178/107 -- -- -- -- -- -- -- --   05/05/20 2203 (!) 214/114 97  F (36.1  C) Temporal -- 110 18 100 % 1.803 m (5' 11\") (!) 152.8 kg (336 lb 13.8 oz)        Physical Exam    Constitutional:  Pleasant, age appropriate.      Resting comfortably in the bed.  HEENT:    Right EAC: cerumen present without obstruction      TM normal      Left EAC: complete cerumen impaction     External auditory " canals without cerumen impaction.     Oropharynx is moist, without lesions or trismus.  Eyes:    Conjunctiva normal, PERRL     Extra-ocular movements intact.     No nystagmus  Neck:    Supple, no meningismus.     CV:     Regular rate and rhythm.      No murmurs, rubs or gallops.        2+ radial pulses bilateral.       No lower extremity edema.  PULM:    Clear to auscultation bilateral.       No respiratory distress.      Good air exchange.     No rales or wheezing.     No stridor.  ABD:    Soft, non-tender, non-distended.       No pulsatile masses.       No rebound, guarding or rigidity.  MSK:     No gross deformity to all four extremities.   LYMPH:   No cervical lymphadenopathy.  NEURO:   Alert and oriented x 3.      Cranial nerves II-XII intact.     Strength is 5/5 in all 4 extremities.     Sensation intact to all 4 extremities.     Finger to nose normal bilateral     No clonus, down-going Babinski bilateral.     Test of skew normal.     Gait normal.  Skin:    Warm, dry and intact.    Psych:    Mood is good and affect is appropriate.    Emergency Department Course     ECG (22:23:03):  Rate 98 bpm.   QT/QTc 332/423.   P-R-T axes 30.   Normal sinus rhythm with sinus rhythm   No significant change when compared to 10/22/14  Interpreted at 2240 by Fortino Thornton MD.       Laboratory:    Labs Ordered and Resulted from Time of ED Arrival Up to the Time of Departure from the ED   CBC WITH PLATELETS - Abnormal; Notable for the following components:       Result Value    MCHC 30.7 (*)     All other components within normal limits   BASIC METABOLIC PANEL   PERIPHERAL IV CATHETER          Emergency Department Course:  Past medical records, nursing notes, and vitals reviewed.  I performed an exam of the patient and obtained history, as documented above.    Left EAC irrigated by ER-T. Re-evaluation of left ear: no residual cerumen. TM visualized without abnormality. Patient ambulated and asymptomatic.    I rechecked  the patient. Findings and plan explained to the Patient. Patient was discharged home.    Impression & Plan      Medical Decision Makin-year-old male presented to the ED with dizziness described as vertigo as well as a fullness and decreased hearing in his left ear.  He has no features concerning for central vertigo.  He is noted to have cerumen impaction the left which was completely removed with irrigation.  After cerumen impaction was removed, his symptoms have completely resolved further supporting peripheral vertigo secondary to cerumen impaction.  EKG and basic laboratory studies checked due to dizziness and associated elevated blood pressure upon presentation.  No evidence of renal insufficiency.  Blood pressure remarkably improved without antihypertensives.  Patient to follow with primary care physician for blood pressure recheck in 1 week.    Diagnosis:    ICD-10-CM    1. Vertigo  R42    2. Impacted cerumen of left ear  H61.22    3. Elevated blood pressure reading without diagnosis of hypertension  R03.0         Discharge Medications:  New Prescriptions    No medications on file        2020   Fortino Thornton MD Matthews, Jeremiah R, MD  [id number]

## 2020-05-06 NOTE — DISCHARGE INSTRUCTIONS
Please make an appointment to follow up with [facility] [phone] in 7-10 days even if entirely better for blood pressure recheck.    Discharge Instructions  Vertigo  You have been diagnosed with vertigo.  This is a dizzy feeling often described as spinning or that the room is moving around you. You will often have nausea (sick to your stomach), vomiting (throwing up), and balance problems with it.  Vertigo is usually caused by a problem in the inner ear which helps control your balance.  Many things can cause vertigo, including calcium collections in the inner ear, a virus infection of the inner ear, concussion, migraine, and some medicines.  Luckily, these causes are not life threatening and will eventually go away.  However, sometimes there is a serious problem that does not show up right away.  Generally, every Emergency Department visit should have a follow-up clinic visit with either a primary or a specialty clinic/provider. Please follow-up as instructed by your emergency provider today.  Return to the Emergency Department if you have:  New or severe headache.  Double vision (seeing two of things).  Trouble speaking or hearing.  Weakness or trouble moving/using one side of your body.  Passing out.  Numbness or tingling.  Chest pain.  Vomiting that will not stop.    Treatment:  There are several commonly prescribed medications:  Antihistamines such as meclizine (Antivert ), dimenhydrinate (Dramamine ), or diphenhydramine (Benadryl ).  Prescription anti-nausea medicines, such as promethazine (Phenergan ), metoclopramide (Reglan ), or ondansetron (Zofran ).  Prescription sedative medicines, such as diazepam (Valium ), lorazepam (Ativan ), or clonazepam (Klonopin ).  Most of these medicines make you sleepy, and you should not take them before you work or drive. You should only take prescription medicines to treat severe vertigo symptoms, and you should stop the medicine when your symptoms  improve.    Follow Up:  If you have vertigo longer than three days, it is important that you follow up either with your primary provider or an Ear, Nose, and Throat (ENT) specialist.  You may need further testing to evaluate your vertigo and you may also need  vestibular  therapy which is a special form of physical therapy to make the vertigo go away.    If you were given a prescription for medicine here today, be sure to read all of the information (including the package insert) that comes with your prescription.  This will include important information about the medicine, its side effects, and any warnings that you need to know about.  The pharmacist who fills the prescription can provide more information and answer questions you may have about the medicine.  If you have questions or concerns that the pharmacist cannot address, please call or return to the Emergency Department.     Remember that you can always come back to the Emergency Department if you are not able to see your regular provider in the amount of time listed above, if you get any new symptoms, or if there is anything that worries you.

## 2020-05-06 NOTE — ED TRIAGE NOTES
Pt presents for headache, dizziness difficulty hearing out of the left ear in morning x 3-4 days. When dizziness is present, pt becomes anxious and then short of breath from the anxiety.

## 2020-07-29 ENCOUNTER — HOSPITAL ENCOUNTER (EMERGENCY)
Facility: CLINIC | Age: 25
Discharge: HOME | End: 2020-07-30
Attending: EMERGENCY MEDICINE | Admitting: EMERGENCY MEDICINE
Payer: OTHER GOVERNMENT

## 2020-07-29 ENCOUNTER — APPOINTMENT (OUTPATIENT)
Dept: GENERAL RADIOLOGY | Facility: CLINIC | Age: 25
End: 2020-07-29
Attending: EMERGENCY MEDICINE
Payer: OTHER GOVERNMENT

## 2020-07-29 VITALS
RESPIRATION RATE: 21 BRPM | HEART RATE: 105 BPM | SYSTOLIC BLOOD PRESSURE: 143 MMHG | TEMPERATURE: 97.8 F | HEIGHT: 71 IN | WEIGHT: 315 LBS | BODY MASS INDEX: 44.1 KG/M2 | OXYGEN SATURATION: 93 % | DIASTOLIC BLOOD PRESSURE: 87 MMHG

## 2020-07-29 DIAGNOSIS — R03.0 ELEVATED BLOOD PRESSURE READING WITHOUT DIAGNOSIS OF HYPERTENSION: ICD-10-CM

## 2020-07-29 DIAGNOSIS — R07.89 OTHER CHEST PAIN: ICD-10-CM

## 2020-07-29 DIAGNOSIS — Z20.822 SUSPECTED COVID-19 VIRUS INFECTION: ICD-10-CM

## 2020-07-29 LAB
ANION GAP SERPL CALCULATED.3IONS-SCNC: 2 MMOL/L (ref 3–14)
BASOPHILS # BLD AUTO: 0 10E9/L (ref 0–0.2)
BASOPHILS NFR BLD AUTO: 0.2 %
BUN SERPL-MCNC: 12 MG/DL (ref 7–30)
CALCIUM SERPL-MCNC: 8.6 MG/DL (ref 8.5–10.1)
CHLORIDE SERPL-SCNC: 105 MMOL/L (ref 94–109)
CO2 SERPL-SCNC: 30 MMOL/L (ref 20–32)
CREAT SERPL-MCNC: 0.9 MG/DL (ref 0.66–1.25)
D DIMER PPP FEU-MCNC: 0.3 UG/ML FEU (ref 0–0.5)
DEPRECATED S PYO AG THROAT QL EIA: NEGATIVE
DIFFERENTIAL METHOD BLD: ABNORMAL
EOSINOPHIL # BLD AUTO: 0.2 10E9/L (ref 0–0.7)
EOSINOPHIL NFR BLD AUTO: 1.6 %
ERYTHROCYTE [DISTWIDTH] IN BLOOD BY AUTOMATED COUNT: 12.9 % (ref 10–15)
GFR SERPL CREATININE-BSD FRML MDRD: >90 ML/MIN/{1.73_M2}
GLUCOSE SERPL-MCNC: 94 MG/DL (ref 70–99)
HCT VFR BLD AUTO: 48.2 % (ref 40–53)
HGB BLD-MCNC: 14.6 G/DL (ref 13.3–17.7)
IMM GRANULOCYTES # BLD: 0 10E9/L (ref 0–0.4)
IMM GRANULOCYTES NFR BLD: 0.3 %
LYMPHOCYTES # BLD AUTO: 3.7 10E9/L (ref 0.8–5.3)
LYMPHOCYTES NFR BLD AUTO: 30.2 %
MCH RBC QN AUTO: 28.1 PG (ref 26.5–33)
MCHC RBC AUTO-ENTMCNC: 30.3 G/DL (ref 31.5–36.5)
MCV RBC AUTO: 93 FL (ref 78–100)
MONOCYTES # BLD AUTO: 0.9 10E9/L (ref 0–1.3)
MONOCYTES NFR BLD AUTO: 7 %
NEUTROPHILS # BLD AUTO: 7.5 10E9/L (ref 1.6–8.3)
NEUTROPHILS NFR BLD AUTO: 60.7 %
NRBC # BLD AUTO: 0 10*3/UL
NRBC BLD AUTO-RTO: 0 /100
PLATELET # BLD AUTO: 384 10E9/L (ref 150–450)
POTASSIUM SERPL-SCNC: 3.9 MMOL/L (ref 3.4–5.3)
RBC # BLD AUTO: 5.2 10E12/L (ref 4.4–5.9)
SODIUM SERPL-SCNC: 137 MMOL/L (ref 133–144)
SPECIMEN SOURCE: NORMAL
TROPONIN I SERPL-MCNC: <0.015 UG/L (ref 0–0.04)
WBC # BLD AUTO: 12.3 10E9/L (ref 4–11)

## 2020-07-29 PROCEDURE — U0003 INFECTIOUS AGENT DETECTION BY NUCLEIC ACID (DNA OR RNA); SEVERE ACUTE RESPIRATORY SYNDROME CORONAVIRUS 2 (SARS-COV-2) (CORONAVIRUS DISEASE [COVID-19]), AMPLIFIED PROBE TECHNIQUE, MAKING USE OF HIGH THROUGHPUT TECHNOLOGIES AS DESCRIBED BY CMS-2020-01-R: HCPCS | Performed by: EMERGENCY MEDICINE

## 2020-07-29 PROCEDURE — 84484 ASSAY OF TROPONIN QUANT: CPT | Performed by: EMERGENCY MEDICINE

## 2020-07-29 PROCEDURE — 40001204 ZZHCL STATISTIC STREP A RAPID: Performed by: EMERGENCY MEDICINE

## 2020-07-29 PROCEDURE — 85379 FIBRIN DEGRADATION QUANT: CPT | Performed by: EMERGENCY MEDICINE

## 2020-07-29 PROCEDURE — 87651 STREP A DNA AMP PROBE: CPT | Performed by: EMERGENCY MEDICINE

## 2020-07-29 PROCEDURE — C9803 HOPD COVID-19 SPEC COLLECT: HCPCS

## 2020-07-29 PROCEDURE — 99285 EMERGENCY DEPT VISIT HI MDM: CPT | Mod: 25

## 2020-07-29 PROCEDURE — 85025 COMPLETE CBC W/AUTO DIFF WBC: CPT | Performed by: EMERGENCY MEDICINE

## 2020-07-29 PROCEDURE — 25000132 ZZH RX MED GY IP 250 OP 250 PS 637: Performed by: EMERGENCY MEDICINE

## 2020-07-29 PROCEDURE — 93005 ELECTROCARDIOGRAM TRACING: CPT

## 2020-07-29 PROCEDURE — 71045 X-RAY EXAM CHEST 1 VIEW: CPT

## 2020-07-29 PROCEDURE — 80048 BASIC METABOLIC PNL TOTAL CA: CPT | Performed by: EMERGENCY MEDICINE

## 2020-07-29 RX ORDER — ALUMINA, MAGNESIA, AND SIMETHICONE 2400; 2400; 240 MG/30ML; MG/30ML; MG/30ML
30 SUSPENSION ORAL
Status: COMPLETED | OUTPATIENT
Start: 2020-07-29 | End: 2020-07-29

## 2020-07-29 RX ADMIN — ALUMINUM HYDROXIDE, MAGNESIUM HYDROXIDE, AND DIMETHICONE 30 ML: 400; 400; 40 SUSPENSION ORAL at 23:56

## 2020-07-29 ASSESSMENT — ENCOUNTER SYMPTOMS
BACK PAIN: 1
SHORTNESS OF BREATH: 1
DIARRHEA: 0
NAUSEA: 0
COUGH: 0
CHEST TIGHTNESS: 1
FEVER: 0
ABDOMINAL PAIN: 0
VOMITING: 0

## 2020-07-29 ASSESSMENT — MIFFLIN-ST. JEOR: SCORE: 2576.58

## 2020-07-29 NOTE — ED AVS SNAPSHOT
[facility] Emergency Department  [address]  [facility] [address]  Phone:  [phone]  Fax:  [phone]                                    Kar Bower Jr.   MRN: [id number]    Department:  [facility] Emergency Department   Date of Visit:  7/29/2020           After Visit Summary Signature Page    I have received my discharge instructions, and my questions have been answered. I have discussed any challenges I see with this plan with the nurse or doctor.    ..........................................................................................................................................  Patient/Patient Representative Signature      ..........................................................................................................................................  Patient Representative Print Name and Relationship to Patient    ..................................................               ................................................  Date                                   Time    ..........................................................................................................................................  Reviewed by Signature/Title    ...................................................              ..............................................  Date                                               Time          22EPIC Rev 08/18

## 2020-07-30 ENCOUNTER — TELEPHONE (OUTPATIENT)
Dept: EMERGENCY MEDICINE | Facility: CLINIC | Age: 25
End: 2020-07-30

## 2020-07-30 DIAGNOSIS — J02.0 STREP THROAT: ICD-10-CM

## 2020-07-30 LAB
INTERPRETATION ECG - MUSE: NORMAL
SARS-COV-2 RNA SPEC QL NAA+PROBE: NOT DETECTED
SPECIMEN SOURCE: ABNORMAL
SPECIMEN SOURCE: NORMAL
STREP GROUP A PCR: ABNORMAL

## 2020-07-30 RX ORDER — PENICILLIN V POTASSIUM 500 MG/1
500 TABLET, FILM COATED ORAL 2 TIMES DAILY
Qty: 20 TABLET | Refills: 0 | Status: SHIPPED | OUTPATIENT
Start: 2020-07-30 | End: 2020-08-09

## 2020-07-30 NOTE — DISCHARGE INSTRUCTIONS
Discharge Instructions  COVID-19    Your Provider has determined that you should practice self-isolation and self-monitoring in order to protect yourself and your community from COVID-19, which is the disease caused by a new coronavirus. The virus spreads from person to person primarily by droplets when an infected person coughs or sneezes and the droplet either lands on another person or that other person touches a surface with the droplet on it. Diagnosis of COVID-19 can be made with a test but many times the test is unavailable or not necessary. There is no specific treatment or medicine for the disease.    Symptoms of COVID-19  Many people have no symptoms or mild symptoms.  Symptoms may usually appear 4 to 5 days (up to 14 days) after contact with another ill person. Some people will get severe symptoms and pneumonia. Usual symptoms are:       Fever    Cough    Trouble breathing    Less common symptoms are: Headache, body aches, sore throat,     sneezing, diarrhea.    How to Care for Yourself    Stay home.  Most people will recover from illness with mild symptoms.  Isolation by staying home is the best method to prevent the spread of the illness. Do not go to work or school. Have a friend or relative do your shopping. Do not use public transportation (bus, train) or ridesharing (Lyft, Uber).    How long should I stay home?  If you have symptoms of a respiratory disease (fever, cough), you should stay home for at least 10 days, and for 3 days with no fever and improvement of respiratory symptoms--whichever is longer. (Your fever should be gone for 3 days without using fever-reducing medicine.)    For example, if you have a fever and cough for 6 days, you need to stay home 4 more days with no fever for a total of 10 days. Or, if you have a fever and cough for 8 days, you need to stay home 3 more days with no fever for a total of 11 days.    Separate yourself from other people: in your home.?As much as possible,  you should stay in one room and away from other people in your home. Also, use a separate bathroom, if possible. Avoid handling pets or other animals while sick.     Wear a facemask: if you need to be around other people and cover your mouth and nose with a tissue when you cough or sneeze.     Avoid sharing personal household items: You should not share dishes, drinking glasses, forks/knives/spoons, towels, or bedding with other people in your home. After using these items, they should be washed with soap and water. Clean parts of your home that are touched often (doorknobs, faucets, countertops, etc.) daily.     Wash your hands: often with soap and water for at least 20 seconds or use an alcohol-based hand  containing at least 60% alcohol.     Avoid touching your face.    Treat your symptoms: Take Acetaminophen (Tylenol) to treat body aches and fever as needed for comfort. Ibuprofen (Advil or Motrin) can be used as well if you still have symptoms after taking Tylenol.  Drink fluids. Rest.    Watch for worsening symptoms: shortness of breath, or difficulty breathing.    Employers/workplaces: are being asked by the Centers for Disease Control (CDC) to not request notes/documentation for you to return to work or prove that you were ill. You may choose to show your employer this paperwork.    Return to the Emergency Department if:  If you are developing worsening breathing, shortness of breath, or feel worse you should seek medical attention.  If you are uncertain, contact your health care provider/clinic. If you need emergency medical attention, call 911 and tell them you have been ill.    Discharge Instructions  Hypertension - High Blood Pressure    During you visit to the Emergency Department, your blood pressure was higher than the recommended blood pressure.  This may be related to stress, pain, medication or other temporary conditions. In these cases, your blood pressure may return to normal on its own.  If you have a history of high blood pressure, you may need to have your doctor adjust your medications. Sometimes, your high measurement here may indicate that you have developed high blood pressure that will stay high unless it is treated. Sudden very high blood pressure can cause problems, but usually high blood pressure causes problems over months to years.      Blood pressure is almost never lowered in the Emergency Department, because studies have shown that lowering blood pressure too quickly is much more dangerous than leaving it alone.    You need to follow up with your doctor in 1-3 days to get your blood pressure rechecked.     Return to the Emergency Department if you start to have:  A severe headache.  Chest pain.  Shortness of breath.  Weakness or numbness that affects one part of the body.  Confusion.  Vision changes.  Significant swelling of legs and/or eyes.  A reaction to any medication started in the Emergency Department.    What can I do to help myself?  Avoid alcohol.  Take any blood pressure medicine that you are prescribed.  Get a good night s sleep.  Lower your salt intake.  Exercise.  Lose weight.  Manage stress.    If blood pressure medication was started in the Emergency Department:  The medicine may not have an immediate effect. The body and brain determine what blood pressure you have. The medicine s job is to retrain the body s  thermostat  to a lower blood pressure.  You will need to follow up with your doctor to see how this medicine is working for you.  If you were given a prescription for medicine here today, be sure to read all of the information (including the package insert) that comes with your prescription.  This will include important information about the medicine, its side effects, and any warnings that you need to know about.  The pharmacist who fills the prescription can provide more information and answer questions you may have about the medicine.  If you have questions or  concerns that the pharmacist cannot address, please call or return to the Emergency Department.   Opioid Medication Information    Pain medications are among the most commonly prescribed medicines, so we are including this information for all our patients. If you did not receive pain medication or get a prescription for pain medicine, you can ignore it.     You may have been given a prescription for an opioid (narcotic) pain medicine and/or have received a pain medicine while here in the Emergency Department. These medicines can make you drowsy or impaired. You must not drive, operate dangerous equipment, or engage in any other dangerous activities while taking these medications. If you drive while taking these medications, you could be arrested for DUI, or driving under the influence. Do not drink any alcohol while you are taking these medications.     Opioid pain medications can cause addiction. If you have a history of chemical dependency of any type, you are at a higher risk of becoming addicted to pain medications.  Only take these prescribed medications to treat your pain when all other options have been tried. Take it for as short a time and as few doses as possible. Store your pain pills in a secure place, as they are frequently stolen and provide a dangerous opportunity for children or visitors in your house to start abusing these powerful medications. We will not replace any lost or stolen medicine.  As soon as your pain is better, you should flush all your remaining medication.     Many prescription pain medications contain Tylenol  (acetaminophen), including Vicodin , Tylenol #3 , Norco , Lortab , and Percocet .  You should not take any extra pills of Tylenol  if you are using these prescription medications or you can get very sick.  Do not ever take more than 3000 mg of acetaminophen in any 24 hour period.    All opioids tend to cause constipation. Drink plenty of water and eat foods that have a lot of  fiber, such as fruits, vegetables, prune juice, apple juice and high fiber cereal.  Take a laxative if you don t move your bowels at least every other day. Miralax , Milk of Magnesia, Colace , or Senna  can be used to keep you regular.      Remember that you can always come back to the Emergency Department if you are not able to see your regular doctor in the amount of time listed above, if you get any new symptoms, or if there is anything that worries you.

## 2020-07-30 NOTE — ED PROVIDER NOTES
History     Chief Complaint:  Chest Pain      HPI   Kar Bower Jr. is a 25 year old male who presents for evaluation of chest pain. The patient reports that over the last 10-14 days he has had some new shortness of breath, described as the sensation of having difficulty taking a deep breath. He reports that his shortness of breath is worse when laying flat and he has occasionally woken up gasping for breath. Approximately four days ago he also developed some chest pain and tightness that is most prominent on the left side and radiates into his throat and back. He reports that when pushing on the left side of his chest he has increased pain radiating to his back. Due to his persistent symptoms tonight, the patient came into the ED for evaluation. He has not had any recent fever, cough, abdominal pain, nausea, vomiting, or diarrhea.      Cardiac Risk Factors:  CAD:    Negative   Hypertension:   Negative   Hyperlipidemia:  Negative   Diabetes:   Negative   Tobacco use:   Positive   Gender:   Male   Age:    25   Familial Hx of CAD:  Negative      PE/DVT Risk Factors:   Hx of PE/DVT:   Negative   Hx of clotting disorder:  Negative   Hx of cancer:    Negative   Tobacco use:    Positive   Hormone therapy:   Negative   Prolonged immobilization:  Negative   Recent surgery:   Negative   Recent travel:    Negative   Familial Hx of PE/DVT:  Negative      Allergies:  Sulfa drugs      Medications:    The patient is not currently taking any prescribed medications.     Past Medical History:    The patient denies any relevant past medical history.     Past Surgical History:    History reviewed. No pertinent past surgical history.     Family History:    History reviewed. No pertinent family history.     Social History:  Tobacco use:    Current some day smoker   Alcohol use:    Positive, occasionally   Marital status:    Single      Review of Systems   Constitutional: Negative for fever.   Respiratory: Positive for chest tightness  "and shortness of breath. Negative for cough.    Cardiovascular: Positive for chest pain.   Gastrointestinal: Negative for abdominal pain, diarrhea, nausea and vomiting.   Musculoskeletal: Positive for back pain.   All other systems reviewed and are negative.     Physical Exam   First Vitals:  BP: (!) 170/113  Pulse: 101  Heart Rate: 101  Temp: 97.8  F (36.6  C)  Resp: 16  Height: 180.3 cm (5' 11\")  Weight: (!) 156.9 kg (346 lb)  SpO2: 99 %      Physical Exam   General: The patient is alert, in no respiratory distress.    HENT: Mucous membranes moist.    Cardiovascular: Mildly tachycardic rate and regular rhythm. Good pulses in all four extremities. Normal capillary refill and skin turgor.     Respiratory: Lungs are clear. No nasal flaring. No retractions. No wheezing, no crackles.    Gastrointestinal: Large BMI. No tenderness. Abdomen soft. No guarding, no rebound. No palpable hernias.     Musculoskeletal: No gross deformity.     Skin: No rashes or petechiae.     Neurologic: The patient is alert and oriented x3. GCS 15. No testable cranial nerve deficit. Follows commands with clear and appropriate speech. Gives appropriate answers. Good strength in all extremities. No gross neurologic deficit. Gross sensation intact. Pupils are round and reactive. No meningismus.     Lymphatic: No cervical adenopathy. No lower extremity swelling.    Psychiatric: The patient is non-tearful.     Emergency Department Course   EC sinus rhythm with sinus arrhythmia no pathologic ST elevation ventricular of 84 MA interval 156 QRS duration of 88 and a QTC of 411    Imaging:  Radiographic findings were communicated with the patient who voiced understanding of the findings.    XR Chest Port:  IMPRESSION: No acute abnormality.   Per radiology.     Laboratory:  CBC: WBC 12.3 high, o/w WNL (HGB 14.6, )   BMP: Anion gap 2 low, o/w WNL (Creatinine 0.90)    Troponin I 9: <0.015   D dimer quantitative: 0.3   Streptococcus A rapid " screen with reflex to PCR: Negative   Group A streptococcus PCR throat swab: Pending   Symptomatic COVID-19 Virus by PCR: Pending      Emergency Department Course:  Nursing notes and vitals reviewed.  2246: I performed an exam of the patient as documented above.      A nasopharyngeal swab was obtained here in the ED.  An oropharyngeal swab was obtained here in the ED.  EKG obtained in the ED, see results above.   IV was inserted and blood was drawn for laboratory testing, results above.  Portable chest X-ray obtained while in the emergency department, results above.      I personally reviewed the laboratory results with the patient and answered all related questions prior to discharge.     Findings and plan explained to the Patient. Patient discharged home with instructions regarding supportive care, medications, and reasons to return. The importance of close follow-up was reviewed.     Impression & Plan      Medical Decision Making:  The patient has a low pretest probability for ACS and describes symptoms that do not sound consistent with myocardial ischemia.  Some of his symptoms definitely sound infectious in nature especially he feels a mucus sensation in his throat associated with shortness of breath.  His symptoms are not exertional.  I did consider PE pneumonia pneumothorax anemia ACS arrhythmia amongst other conditions.  I was concerned about COVID as well.  His labs here look reassuring I do not think like this is ischemic in nature.  GERD is possibly because his symptoms are worse when he is laying flat.  I did give a dose of Maalox here and suggest to use that at home as well.  He will need further outpatient follow-up for Kopit test is pending he was told to self isolate and to that result comes back.  He has not hypoxic and is not required mission the hospital.    Covid-19  Kar Bower Jr. was evaluated during a global COVID-19 pandemic, which necessitated consideration that the patient might be at  risk for infection with the SARS-CoV-2 virus that causes COVID-19.   Applicable protocols for evaluation were followed during the patient's care.   COVID-19 was considered as part of the patient's evaluation. The plan for testing is:  a test was obtained during this visit.      Diagnosis:    ICD-10-CM   1. Other chest pain  R07.89   2. Suspected COVID-19 virus infection  Z20.828   3. Elevated blood pressure reading without diagnosis of hypertension  R03.0      Disposition:  Discharged to home.         I, Richard Higgins, am serving as a scribe at 10:46 PM on 7/29/2020 to document services personally performed by Dr. Kaba, based on my observations and the provider's statements to me.      [facility] EMERGENCY DEPARTMENT       Ky Kaba MD  07/30/20 0206

## 2020-07-30 NOTE — TELEPHONE ENCOUNTER
[facility] Emergency Department Lab result notification [Adult-Male]    [place] ED lab result protocol used  Beta Strep Throat    Reason for call  Notify of lab results, assess symptoms,  review ED providers recommendations/discharge instructions (if necessary) and advise per ED lab result f/u protocol    Lab Result (including Rx patient on, if applicable)  Group A Streptococcus PCR is POSITIVE.  Emergency Dept/Urgent Care discharge antibiotic: None  Advise per [facility] ED lab result protocol - Strep protocol.      Information table from ED Provider visit on 7/29/20  Symptoms reported at ED visit (Chief complaint, HPI) Chest Pain      HPI   Kar Bower Jr. is a 25 year old male who presents for evaluation of chest pain. The patient reports that over the last 10-14 days he has had some new shortness of breath, described as the sensation of having difficulty taking a deep breath. He reports that his shortness of breath is worse when laying flat and he has occasionally woken up gasping for breath. Approximately four days ago he also developed some chest pain and tightness that is most prominent on the left side and radiates into his throat and back. He reports that when pushing on the left side of his chest he has increased pain radiating to his back. Due to his persistent symptoms tonight, the patient came into the ED for evaluation. He has not had any recent fever, cough, abdominal pain, nausea, vomiting, or diarrhea.    Significant Medical hx, if applicable (i.e. CKD, diabetes) NA   Allergies Allergies   Allergen Reactions     Sulfa Drugs       Weight, if applicable Wt Readings from Last 2 Encounters:   07/29/20 (!) 156.9 kg (346 lb)   05/05/20 (!) 152.8 kg (336 lb 13.8 oz)      Coumadin/Warfarin [Yes /No] No   Creatinine Level (mg/dl) Creatinine   Date Value Ref Range Status   07/29/2020 0.90 0.66 - 1.25 mg/dL Final      Creatinine clearance (ml/min), if applicable Serum creatinine: 0.9  mg/dL 07/29/20 2129  Estimated creatinine clearance: 191.5 mL/min   ED providers Impression and Plan (applicable information) The patient has a low pretest probability for ACS and describes symptoms that do not sound consistent with myocardial ischemia.  Some of his symptoms definitely sound infectious in nature especially he feels a mucus sensation in his throat associated with shortness of breath.  His symptoms are not exertional.  I did consider PE pneumonia pneumothorax anemia ACS arrhythmia amongst other conditions.  I was concerned about COVID as well.  His labs here look reassuring I do not think like this is ischemic in nature.  GERD is possibly because his symptoms are worse when he is laying flat.  I did give a dose of Maalox here and suggest to use that at home as well.  He will need further outpatient follow-up for Kopit test is pending he was told to self isolate and to that result comes back.  He has not hypoxic and is not required mission the hospital.     Covid-19  Kar Bower Jr. was evaluated during a global COVID-19 pandemic, which necessitated consideration that the patient might be at risk for infection with the SARS-CoV-2 virus that causes COVID-19.   Applicable protocols for evaluation were followed during the patient's care.   COVID-19 was considered as part of the patient's evaluation. The plan for testing is:  a test was obtained during this visit.   ED diagnosis 1. Other chest pain  R07.89   2. Suspected COVID-19 virus infection  Z20.828   3. Elevated blood pressure reading without diagnosis of hypertension        ED provider Ky Kaba MD  07/30/20 0206      RN Assessment (Patient s current Symptoms), include time called.  [Insert Left message here if message left]  He is feeling good. Denies any symptoms at this time.     RN Recommendations/Instructions per [place] ED lab result protocol  Patient notified of lab result and treatment recommendations.  Rx for Penicillin V  Potassium (VEETID) 500 MG tablet,  1 tablet (500 mg) by mouth 2 times daily for 10 days. sent to [Pharmacy - [facility]].  RN reviewed information about Advised to finish full course of antibiotics as prescribed and drink plenty of fluids. Eat yogurt, cottage cheese or take probiotics as needed. And follow up with your PCP as the ED provider recommended.      Please Contact your PCP clinic or return to the Emergency department if your:    Symptoms return.    Symptoms do not improve after 3 days on antibiotic.    Symptoms do not resolve after completing antibiotic.    Symptoms worsen or other concerning symptom's.    PCP follow-up Questions asked: YES       Amirah Brownlee RN  [facility]   Lung Nodule and ED Lab Result F/U RN  Epic pool (ED late result f/u RN): P [id number]  # [phone]

## 2021-01-15 ENCOUNTER — HEALTH MAINTENANCE LETTER (OUTPATIENT)
Age: 26
End: 2021-01-15

## 2021-10-24 ENCOUNTER — HEALTH MAINTENANCE LETTER (OUTPATIENT)
Age: 26
End: 2021-10-24

## 2022-02-13 ENCOUNTER — HEALTH MAINTENANCE LETTER (OUTPATIENT)
Age: 27
End: 2022-02-13

## 2022-09-15 ENCOUNTER — HOSPITAL ENCOUNTER (EMERGENCY)
Facility: CLINIC | Age: 27
Discharge: HOME | End: 2022-09-15
Attending: EMERGENCY MEDICINE | Admitting: EMERGENCY MEDICINE

## 2022-09-15 VITALS
RESPIRATION RATE: 20 BRPM | HEIGHT: 71 IN | OXYGEN SATURATION: 97 % | WEIGHT: 315 LBS | DIASTOLIC BLOOD PRESSURE: 85 MMHG | SYSTOLIC BLOOD PRESSURE: 131 MMHG | HEART RATE: 105 BPM | TEMPERATURE: 98.1 F | BODY MASS INDEX: 44.1 KG/M2

## 2022-09-15 DIAGNOSIS — F10.20 ACUTE ALCOHOLISM (H): ICD-10-CM

## 2022-09-15 DIAGNOSIS — F32.A DEPRESSION, UNSPECIFIED DEPRESSION TYPE: ICD-10-CM

## 2022-09-15 DIAGNOSIS — F19.10 POLYSUBSTANCE ABUSE (H): ICD-10-CM

## 2022-09-15 DIAGNOSIS — F32.9 MAJOR DEPRESSIVE DISORDER, SINGLE EPISODE, UNSPECIFIED: ICD-10-CM

## 2022-09-15 LAB
ALCOHOL BREATH TEST: 0.14 (ref 0–0.01)
AMPHETAMINES UR QL SCN: NORMAL
BARBITURATES UR QL: NORMAL
BENZODIAZ UR QL: NORMAL
CANNABINOIDS UR QL SCN: NORMAL
COCAINE UR QL: NORMAL
OPIATES UR QL SCN: NORMAL

## 2022-09-15 PROCEDURE — 90791 PSYCH DIAGNOSTIC EVALUATION: CPT

## 2022-09-15 PROCEDURE — 82075 ASSAY OF BREATH ETHANOL: CPT

## 2022-09-15 PROCEDURE — 99285 EMERGENCY DEPT VISIT HI MDM: CPT | Mod: 25

## 2022-09-15 PROCEDURE — 99284 EMERGENCY DEPT VISIT MOD MDM: CPT | Performed by: EMERGENCY MEDICINE

## 2022-09-15 PROCEDURE — 80307 DRUG TEST PRSMV CHEM ANLYZR: CPT | Performed by: EMERGENCY MEDICINE

## 2022-09-15 ASSESSMENT — ACTIVITIES OF DAILY LIVING (ADL)
ADLS_ACUITY_SCORE: 35
ADLS_ACUITY_SCORE: 35

## 2022-09-15 NOTE — ED TRIAGE NOTES
Triage Assessment     Row Name 09/15/22 0436       Triage Assessment (Adult)    Airway WDL WDL       Respiratory WDL    Respiratory WDL WDL       Skin Circulation/Temperature WDL    Skin Circulation/Temperature WDL WDL       Cardiac WDL    Cardiac WDL WDL       Peripheral/Neurovascular WDL    Peripheral Neurovascular WDL WDL       Cognitive/Neuro/Behavioral WDL    Cognitive/Neuro/Behavioral WDL WDL

## 2022-09-15 NOTE — ED NOTES
Bed: HW02  Expected date: 9/15/22  Expected time: 4:13 AM  Means of arrival: Ambulance  Comments:  26 y/o Male ETOH/MH eval

## 2022-09-15 NOTE — ED PROVIDER NOTES
"ED Provider Note  [facility]      History     Chief Complaint   Patient presents with     Addiction Problem     Suicidal     Fleeting thoughts SI  No plan     HPI  Kar Bower Jr. is a 27 year old male who presents to us with a chief complaint of depression, passive suicidal thoughts, alcohol intoxication and recent cocaine use.  Patient is increasingly upset as he has recently lost his job.  He has also been informed that his girlfriend whom he lives with will be evicted from their place.  He has not had any plan to hurt or kill himself however he has been having recent passive thoughts of suicide lately.  He did drink quite a bit of alcohol this evening as well as he used some cocaine.  He does have usually 1-2 drinks daily but does not drink to excess on a regular basis.  He is worried his girlfriend will minimize his depression and mental health issues.  He is requesting a metabolic evaluation    Past Medical History  Past Medical History:   Diagnosis Date     Anxiety      History reviewed. No pertinent surgical history.  No current outpatient medications on file.    Allergies   Allergen Reactions     Sulfa Drugs      Family History  History reviewed. No pertinent family history.  Social History   Social History     Tobacco Use     Smoking status: Current Some Day Smoker   Substance Use Topics     Drug use: Yes     Types: Cocaine, Marijuana      Past medical history, past surgical history, medications, allergies, family history, and social history were reviewed with the patient. No additional pertinent items.       Review of Systems  A complete review of systems was performed with pertinent positives and negatives noted in the HPI, and all other systems negative.    Physical Exam   BP: 131/85  Pulse: 105  Temp: 98.1  F (36.7  C)  Resp: 20  Height: 180.3 cm (5' 11\")  Weight: (!) 158.8 kg (350 lb)  SpO2: 97 %  Physical Exam  Vitals and nursing note reviewed.   Constitutional:       " General: He is not in acute distress.     Appearance: He is well-developed.   HENT:      Head: Normocephalic.      Right Ear: External ear normal.      Left Ear: External ear normal.      Nose: Nose normal.   Eyes:      Extraocular Movements: Extraocular movements intact.      Conjunctiva/sclera: Conjunctivae normal.   Cardiovascular:      Rate and Rhythm: Normal rate and regular rhythm.   Pulmonary:      Effort: Pulmonary effort is normal. No respiratory distress.      Breath sounds: Normal breath sounds.   Abdominal:      General: Abdomen is flat. There is no distension.   Musculoskeletal:         General: No deformity. Normal range of motion.      Cervical back: Normal range of motion and neck supple.   Skin:     General: Skin is warm and dry.   Neurological:      Mental Status: He is alert. Mental status is at baseline.      Comments: Oriented   Psychiatric:         Mood and Affect: Mood normal.         Behavior: Behavior normal.         ED Course      Procedures       Results for orders placed or performed during the hospital encounter of 09/15/22   Alcohol breath test POCT     Status: Abnormal   Result Value Ref Range    Alcohol Breath Test 0.144 (A) 0.00 - 0.01              Results for orders placed or performed during the hospital encounter of 09/15/22   Alcohol breath test POCT     Status: Abnormal   Result Value Ref Range    Alcohol Breath Test 0.144 (A) 0.00 - 0.01     Medications - No data to display     Assessments & Plan (with Medical Decision Making)   27-year-old male presents to us with a chief complaint of requesting mental orthosis evaluation.  Previous records were reviewed.  Labs are interpreted.  Alcohol was elevated 0.144 today.  Despite this the patient is clinically sober.  He was evaluated by myself as well as mental health .  Please see the mental health  notes for further details.  Recommendation at this point is for him to follow-up outpatient.  His mood will likely be  greatly helped if he completes his substance abuse treatment.  He was given information on a rule 25.  He is comfortable with discharge home.  He does state he can contract for safety.    I have reviewed the nursing notes. I have reviewed the findings, diagnosis, plan and need for follow up with the patient.    New Prescriptions    No medications on file       Final diagnoses:   Polysubstance abuse (H)   Depression, unspecified depression type       --  Faisal Martinez  [facility] EMERGENCY DEPARTMENT  9/15/2022     Faisal Martinez,   09/15/22 0606     details…

## 2022-09-15 NOTE — DISCHARGE INSTRUCTIONS
Follow-up with your primary care provider.  Return to the emergency department as needed for any new or worsening symptoms.    Aftercare Plan  If I am feeling unsafe or I am in a crisis, I will:   Contact my established care providers   Call the National Suicide Prevention Lifeline: 988  Go to the nearest emergency room   Call 911     Warning signs that I or other people might notice when a crisis is developing for me: Active suicidal planning or intent    Things I am able to do on my own or with others to cope or help me feel better: Talk to family and friends, abstain from use, talk to therapist     Your [facility] has a mental health crisis team you can call 24/7: [facility]  [phone]    Other things that are important when I'm in crisis: Secure harmful household items     Crisis Lines    Crisis Text Line  Text [phone]  You will be connected with a trained live crisis counselor to provide support.    Por shreyas, texto  BANDAR a [phone] o texto a 442-AYUDAME en WhatsApp    Additional Information    Today you were seen by a licensed mental health professional through Triage and Transition services, [facility] ([facility])  for a crisis assessment in the Emergency Department at [facility].  It is recommended that you follow up with your established providers (psychiatrist, mental health therapist, and/or primary care doctor - as relevant) as soon as possible. Coordinators from [facility] will be calling you in the next 24-48 hours to ensure that you have the resources you need.  You can also contact [facility] coordinators directly at [phone]. You may have been scheduled for or offered an appointment with a mental health provider. [facility] maintains an extensive network of licensed behavioral health providers to connect patients with the services they need.  We do not charge providers a fee to participate in our referral network.  We match patients with providers based on a patient's specific needs,  insurance coverage, and location.  Our first effort will be to refer you to a provider within your care system, and will utilize providers outside your care system as needed.

## 2022-09-15 NOTE — CONSULTS
"Diagnostic Evaluation Consultation  Crisis Assessment    Patient was assessed: In Person  Patient location: [facility] ED  Was a release of information signed: Yes. Providers included on the release: [facility] by pt Kar Bower Jr      Referral Data and Chief Complaint  Pt is a 27 year old male who uses he/him pronouns, and presents to the ED via EMS. Patient is referred to the ED by self. Patient is presenting to the ED for the following concerns: suicidal risk.      Informed Consent and Assessment Methods     Patient is his own guardian. Writer met with patient and explained the crisis assessment process, including applicable information disclosures and limits to confidentiality, assessed understanding of the process, and obtained consent to proceed with the assessment. Patient was observed to be able to participate in the assessment as evidenced by engaged. Assessment methods included conducting a formal interview with patient, review of medical records, collaboration with medical staff, and obtaining relevant collateral information from family and community providers when available..     Over the course of this crisis assessment provided reassurance, offered validation, engaged patient in problem solving and disposition planning, worked with patient on safety and aftercare planning, provided psychoeducation and facilitated family communication.     Summary of Patient Situation     Pt comes to ED on his own because \"I had a breakdown\".  Pt reports he has been feeling more depressed, stress in his relationship which has lead to increasing symptoms of depression, lack of motivation and stress.  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  He says he has been drinking more, tonight was at a bar and drank more than usual and purchased some cocaine from \"some guys at the bar\".  He is pretty sure it was cocaine as this was his drug of choice as an adolescent.  He says he started to feel more worthless and had passive suicidal thoughts, no plan or intent.  He says these thoughts have lessened as he has sobered in the ED, does express some concern that his g/f, who has more significant mental health issues, will minimize his problems in comparison which could lead to additional use and riskier behavior.  He denies violent ideation or intent, denies symptoms of psychosis, denies other use of drugs.  He is agreeable to seek Rule 25 assessment to get professional opinion regarding scope of his use and treatment recommendations as well as seeing a counselor.  Reports he has handout already from [facility] regarding counseling and substance abuse services.    Brief Psychosocial History     Pt raised in [place] and [place], father was a seasonal worker, mother  of cancer when pt was a child, HS graduate, has worked in [employer], currently living with g/f, denies legal issues.    Significant Clinical History     Pt reports he attempted suicide twice as an adolescent, took handful of pills then threw them up, no medical care or report, also planned to cut from wrist to elbow with knife but father came and interrupted this.  Pt says he was drinking at the time, also was abusing cocaine, entered CD treatment as an adolescent for cocaine.  He has not has subsequent CD treatment, reports no history of mental health treatment.     Collateral Information    None     Risk Assessment  ESS-6  1.a. Over the past 2 weeks, have you had thoughts of killing yourself? No  1.b. Have you ever attempted to kill yourself and, if yes, when did this last happen? Yes overdose at age " 16   2. Recent or current suicide plan? No   3. Recent or current intent to act on ideation? No  4. Lifetime psychiatric hospitalization? No  5. Pattern of excessive substance use? Yes  6. Current irritability, agitation, or aggression? No  Scoring note: BOTH 1a and 1b must be yes for it to score 1 point, if both are not yes it is zero. All others are 1 point per number. If all questions 1a/1b - 6 are no, risk is negligible. If one of 1a/1b is yes, then risk is mild. If either question 2 or 3, but not both, is yes, then risk is automatically moderate regardless of total score. If both 2 and 3 are yes, risk is automatically high regardless of total score.      Score: 1, mild risk      Does the patient have access to lethal means? No     Does the patient engage in non-suicidal self-injurious behavior (NSSI/SIB)? no     Does the patient have thoughts of harming others? No     Is the patient engaging in sexually inappropriate behavior?  no        Current Substance Abuse     Is there recent substance abuse? alcohol, episodic cocaine use     Was a urine drug screen or blood alcohol level obtained: Yes .144 alcohol       Mental Status Exam     Affect: Constricted   Appearance: Appropriate    Attention Span/Concentration: Attentive  Eye Contact: Engaged   Fund of Knowledge: Appropriate    Language /Speech Content: Fluent   Language /Speech Volume: Normal    Language /Speech Rate/Productions: Articulate    Recent Memory: Intact   Remote Memory: Intact   Mood: Depressed    Orientation to Person: Yes    Orientation to Place: Yes   Orientation to Time of Day: Yes    Orientation to Date: Yes    Situation (Do they understand why they are here?): Yes    Psychomotor Behavior: Normal    Thought Content: Clear   Thought Form: Intact      History of commitment: No      Medication    Psychotropic medications: No  Medication changes made in the last two weeks: No       Current Care Team    Primary Care Provider: No  Psychiatrist:  No  Therapist: No  : No    Diagnosis    Unspecified Depressive D/O F32.9  Alcohol use disorder, moderate F10.20      Clinical Summary and Substantiation of Recommendations    Pt presents for assessment of suicidal thoughts, no acute risk at this time, recommend discharge, abstain from alcohol use, take medications as prescribed, keep scheduled appointments, utilize community crisis services or ED if symptoms worsen.  Disposition    Recommended disposition: Individual Therapy and Rule 25/LISBET Assessment       Reviewed case and recommendations with attending provider. Attending Name: Dr. Martinez       Attending concurs with disposition: Yes       Patient concurs with disposition: Yes       Guardian concurs with disposition: NA      Final disposition: Individual therapy  and Rule 25/LISBET Assessment.     Outpatient Details (if applicable):   Aftercare plan and appointments placed in the AVS and provided to patient: Yes. Given to patient by JIMY orally and by RN in writing      Assessment Details    Patient interview started at: 0500 and completed at: 0600.     Total duration spent on the patient case in minutes: 1.0 hrs      CPT code(s) utilized: 42946 - Psychotherapy for Crisis - 60 (30-74*) min       Prashanth [place], SANJEEV, LICSW, JIMY  DEC - Triage & Transition Services  Callback: [phone]

## 2022-09-15 NOTE — ED TRIAGE NOTES
Pt.  called EMS for fleeting suicidal thoughts, anxiety, and substance abuse.  Pt. states has had few drinks and use some cocaine tonight.

## 2022-10-15 ENCOUNTER — HEALTH MAINTENANCE LETTER (OUTPATIENT)
Age: 27
End: 2022-10-15

## 2023-03-26 ENCOUNTER — HEALTH MAINTENANCE LETTER (OUTPATIENT)
Age: 28
End: 2023-03-26

## 2023-04-23 ENCOUNTER — APPOINTMENT (OUTPATIENT)
Dept: GENERAL RADIOLOGY | Facility: CLINIC | Age: 28
End: 2023-04-23
Attending: EMERGENCY MEDICINE

## 2023-04-23 ENCOUNTER — DOCUMENTATION ONLY (OUTPATIENT)
Dept: BEHAVIORAL HEALTH | Facility: CLINIC | Age: 28
End: 2023-04-23

## 2023-04-23 ENCOUNTER — HOSPITAL ENCOUNTER (EMERGENCY)
Facility: CLINIC | Age: 28
Discharge: HOME | End: 2023-04-23
Attending: EMERGENCY MEDICINE | Admitting: EMERGENCY MEDICINE

## 2023-04-23 VITALS
WEIGHT: 300 LBS | DIASTOLIC BLOOD PRESSURE: 84 MMHG | RESPIRATION RATE: 16 BRPM | BODY MASS INDEX: 42 KG/M2 | SYSTOLIC BLOOD PRESSURE: 146 MMHG | HEIGHT: 71 IN | OXYGEN SATURATION: 96 % | TEMPERATURE: 98.5 F | HEART RATE: 120 BPM

## 2023-04-23 DIAGNOSIS — F32.A DEPRESSION, UNSPECIFIED DEPRESSION TYPE: ICD-10-CM

## 2023-04-23 LAB
ALBUMIN SERPL BCG-MCNC: 4.4 G/DL (ref 3.5–5.2)
ALP SERPL-CCNC: 106 U/L (ref 40–129)
ALT SERPL W P-5'-P-CCNC: 130 U/L (ref 10–50)
ANION GAP SERPL CALCULATED.3IONS-SCNC: 13 MMOL/L (ref 7–15)
AST SERPL W P-5'-P-CCNC: 88 U/L (ref 10–50)
ATRIAL RATE - MUSE: 116 BPM
BASOPHILS # BLD AUTO: 0 10E3/UL (ref 0–0.2)
BASOPHILS NFR BLD AUTO: 0 %
BILIRUB SERPL-MCNC: 0.5 MG/DL
BUN SERPL-MCNC: 9.9 MG/DL (ref 6–20)
CALCIUM SERPL-MCNC: 9.1 MG/DL (ref 8.6–10)
CHLORIDE SERPL-SCNC: 102 MMOL/L (ref 98–107)
CREAT SERPL-MCNC: 0.87 MG/DL (ref 0.67–1.17)
DEPRECATED HCO3 PLAS-SCNC: 26 MMOL/L (ref 22–29)
DIASTOLIC BLOOD PRESSURE - MUSE: NORMAL MMHG
EOSINOPHIL # BLD AUTO: 0.3 10E3/UL (ref 0–0.7)
EOSINOPHIL NFR BLD AUTO: 3 %
ERYTHROCYTE [DISTWIDTH] IN BLOOD BY AUTOMATED COUNT: 13.1 % (ref 10–15)
GFR SERPL CREATININE-BSD FRML MDRD: >90 ML/MIN/1.73M2
GLUCOSE SERPL-MCNC: 181 MG/DL (ref 70–99)
HCT VFR BLD AUTO: 47 % (ref 40–53)
HGB BLD-MCNC: 15 G/DL (ref 13.3–17.7)
IMM GRANULOCYTES # BLD: 0 10E3/UL
IMM GRANULOCYTES NFR BLD: 0 %
INTERPRETATION ECG - MUSE: NORMAL
LIPASE SERPL-CCNC: 27 U/L (ref 13–60)
LYMPHOCYTES # BLD AUTO: 2.7 10E3/UL (ref 0.8–5.3)
LYMPHOCYTES NFR BLD AUTO: 30 %
MCH RBC QN AUTO: 28.1 PG (ref 26.5–33)
MCHC RBC AUTO-ENTMCNC: 31.9 G/DL (ref 31.5–36.5)
MCV RBC AUTO: 88 FL (ref 78–100)
MONOCYTES # BLD AUTO: 0.6 10E3/UL (ref 0–1.3)
MONOCYTES NFR BLD AUTO: 7 %
NEUTROPHILS # BLD AUTO: 5.3 10E3/UL (ref 1.6–8.3)
NEUTROPHILS NFR BLD AUTO: 60 %
NRBC # BLD AUTO: 0 10E3/UL
NRBC BLD AUTO-RTO: 0 /100
P AXIS - MUSE: 43 DEGREES
PLATELET # BLD AUTO: 366 10E3/UL (ref 150–450)
POTASSIUM SERPL-SCNC: 3.9 MMOL/L (ref 3.4–5.3)
PR INTERVAL - MUSE: 164 MS
PROT SERPL-MCNC: 8.2 G/DL (ref 6.4–8.3)
QRS DURATION - MUSE: 94 MS
QT - MUSE: 334 MS
QTC - MUSE: 464 MS
R AXIS - MUSE: 71 DEGREES
RBC # BLD AUTO: 5.34 10E6/UL (ref 4.4–5.9)
SARS-COV-2 RNA RESP QL NAA+PROBE: NEGATIVE
SODIUM SERPL-SCNC: 141 MMOL/L (ref 136–145)
SYSTOLIC BLOOD PRESSURE - MUSE: NORMAL MMHG
T AXIS - MUSE: 28 DEGREES
TROPONIN T SERPL HS-MCNC: <6 NG/L
VENTRICULAR RATE- MUSE: 116 BPM
WBC # BLD AUTO: 8.9 10E3/UL (ref 4–11)

## 2023-04-23 PROCEDURE — C9803 HOPD COVID-19 SPEC COLLECT: HCPCS

## 2023-04-23 PROCEDURE — 80053 COMPREHEN METABOLIC PANEL: CPT | Performed by: EMERGENCY MEDICINE

## 2023-04-23 PROCEDURE — U0005 INFEC AGEN DETEC AMPLI PROBE: HCPCS | Performed by: EMERGENCY MEDICINE

## 2023-04-23 PROCEDURE — 36415 COLL VENOUS BLD VENIPUNCTURE: CPT | Performed by: EMERGENCY MEDICINE

## 2023-04-23 PROCEDURE — 85025 COMPLETE CBC W/AUTO DIFF WBC: CPT | Performed by: EMERGENCY MEDICINE

## 2023-04-23 PROCEDURE — 99285 EMERGENCY DEPT VISIT HI MDM: CPT | Mod: 25,CS

## 2023-04-23 PROCEDURE — 71046 X-RAY EXAM CHEST 2 VIEWS: CPT

## 2023-04-23 PROCEDURE — 90791 PSYCH DIAGNOSTIC EVALUATION: CPT

## 2023-04-23 PROCEDURE — 83690 ASSAY OF LIPASE: CPT | Performed by: EMERGENCY MEDICINE

## 2023-04-23 PROCEDURE — 93005 ELECTROCARDIOGRAM TRACING: CPT

## 2023-04-23 PROCEDURE — 84484 ASSAY OF TROPONIN QUANT: CPT | Performed by: EMERGENCY MEDICINE

## 2023-04-23 RX ORDER — MULTIPLE VITAMINS W/ MINERALS TAB 9MG-400MCG
1 TAB ORAL DAILY
Status: DISCONTINUED | OUTPATIENT
Start: 2023-04-23 | End: 2023-04-23 | Stop reason: HOSPADM

## 2023-04-23 RX ORDER — FOLIC ACID 1 MG/1
1 TABLET ORAL DAILY
Status: DISCONTINUED | OUTPATIENT
Start: 2023-04-23 | End: 2023-04-23 | Stop reason: HOSPADM

## 2023-04-23 RX ORDER — LORAZEPAM 1 MG/1
1-2 TABLET ORAL EVERY 30 MIN PRN
Status: DISCONTINUED | OUTPATIENT
Start: 2023-04-23 | End: 2023-04-23 | Stop reason: HOSPADM

## 2023-04-23 RX ORDER — LORAZEPAM 2 MG/ML
1-2 INJECTION INTRAMUSCULAR EVERY 30 MIN PRN
Status: DISCONTINUED | OUTPATIENT
Start: 2023-04-23 | End: 2023-04-23 | Stop reason: HOSPADM

## 2023-04-23 ASSESSMENT — COLUMBIA-SUICIDE SEVERITY RATING SCALE - C-SSRS
ATTEMPT PAST THREE MONTHS: YES
4. HAVE YOU HAD THESE THOUGHTS AND HAD SOME INTENTION OF ACTING ON THEM?: YES
5. HAVE YOU STARTED TO WORK OUT OR WORKED OUT THE DETAILS OF HOW TO KILL YOURSELF? DO YOU INTEND TO CARRY OUT THIS PLAN?: YES
4. HAVE YOU HAD THESE THOUGHTS AND HAD SOME INTENTION OF ACTING ON THEM?: YES
TOTAL  NUMBER OF ACTUAL ATTEMPTS PAST 3 MONTHS: 1
3. HAVE YOU BEEN THINKING ABOUT HOW YOU MIGHT KILL YOURSELF?: YES
2. HAVE YOU ACTUALLY HAD ANY THOUGHTS OF KILLING YOURSELF?: YES
TOTAL  NUMBER OF INTERRUPTED ATTEMPTS LIFETIME: NO
TOTAL  NUMBER OF ACTUAL ATTEMPTS LIFETIME: 3
1. HAVE YOU WISHED YOU WERE DEAD OR WISHED YOU COULD GO TO SLEEP AND NOT WAKE UP?: YES
6. HAVE YOU EVER DONE ANYTHING, STARTED TO DO ANYTHING, OR PREPARED TO DO ANYTHING TO END YOUR LIFE?: NO
ATTEMPT LIFETIME: YES
1. IN THE PAST MONTH, HAVE YOU WISHED YOU WERE DEAD OR WISHED YOU COULD GO TO SLEEP AND NOT WAKE UP?: YES
TOTAL  NUMBER OF ABORTED OR SELF INTERRUPTED ATTEMPTS LIFETIME: NO
2. HAVE YOU ACTUALLY HAD ANY THOUGHTS OF KILLING YOURSELF?: YES
5. HAVE YOU STARTED TO WORK OUT OR WORKED OUT THE DETAILS OF HOW TO KILL YOURSELF? DO YOU INTEND TO CARRY OUT THIS PLAN?: YES

## 2023-04-23 ASSESSMENT — ACTIVITIES OF DAILY LIVING (ADL)
ADLS_ACUITY_SCORE: 35

## 2023-04-23 ASSESSMENT — ENCOUNTER SYMPTOMS
WOUND: 1
SHORTNESS OF BREATH: 1
PALPITATIONS: 1

## 2023-04-23 NOTE — ED PROVIDER NOTES
"  History     Chief Complaint:  Psychiatric Evaluation       HPI   Kar Bower Jr. is a 28 year old male with a history of anxiety who presents with suicidal ideation and self harm. EMS report that Kar was at home with his significant other and was drinking some alcohol when his thoughts \"built up\" and he took a knife and attempted to cut himself. Kar did sustain superficial lacerations to his left wrist and forearm before his significant other was able to intervene. EMS report that Kar was calm and cooperative for them and was voluntary for mental health. During evaluation, Kar states that he did cut himself with the plan to kill himself. He said that tonight's thoughts just \"bundled up,\" which caused him to try and cut himself. He also reports that he's been having recent heart palpitations at home when he starts to think about his health in general and this is also accompanied with a metallic taste in his mouth. He also reports some shortness of breath with walking up the stairs, which has been present for about one month. Kar does report that he was at [place] about one month ago and EMS say that he has not followed up with psychiatry since.     Independent Historian:   EMS supplement as above    ROS:  Review of Systems   Respiratory: Positive for shortness of breath.    Cardiovascular: Positive for palpitations.   Skin: Positive for wound (left wrist, forearm).   Psychiatric/Behavioral: Positive for self-injury and suicidal ideas.   10 point review of systems performed and is negative except as above and in HPI.     Allergies:  Sulfa Drugs     Medications:    The patient is currently on no regular medications.    Past Medical History:    Anxiety  GERD    Social History:  Patient arrived via EMS.  Patient is unaccompanied in the ED.  Patient has history of polysubstance use.  EMS report that the patient drank alcohol this evening.   Patient currently works as an advertisement salesman.     Physical Exam " "    Patient Vitals for the past 24 hrs:   BP Temp Temp src Pulse Resp SpO2 Height Weight   04/23/23 0640 129/87 -- -- -- 16 96 % -- --   04/23/23 0435 (!) 131/98 97.8  F (36.6  C) Oral 120 16 95 % 1.803 m (5' 11\") 136.1 kg (300 lb)        Physical Exam  General: Resting on the gurney, appears comfortable.  Well groomed  Head:  The scalp, face, and head appear normal  Mouth/Throat: Mucus membranes are moist  CV:  Regular rate    Normal S1 and S2  No pathological murmur   Resp:  Breath sounds clear and equal in all fields    Non-labored, no retractions or accessory muscle use    No coarseness    No wheezing   GI:  Abdomen is soft, no rigidity    No guarding, no rebound    No tenderness to palpation  MS:  No evidence of self injury, no lacerations.  No evidence of trauma.  Skin:  Very shallow abrasion to the left forearm, which does not break the skin  Neuro:  Speech is normal.     No apparent focal deficit.      Uses all extremities equally.  Psych:  Awake. Alert. Somewhat flat affect, congruent with stated mood.      Reports suicidal thoughts    No thoughts of harming others.    Emergency Department Course   ECG  ECG taken at 0514, ECG read at 0520  Sinus tachycardia  Incomplete right bundle branch block  Possible anterior infarct, age undetermined  Abnormal ECG   Rate 116 bpm. AK interval 164 ms. QRS duration 94 ms. QT/QTc 334/464 ms. P-R-T axes 43 71 28.     Imaging:  XR Chest 2 Views   Final Result   IMPRESSION: Negative chest.         Report per radiology    Laboratory:  Labs Ordered and Resulted from Time of ED Arrival to Time of ED Departure   COMPREHENSIVE METABOLIC PANEL - Abnormal       Result Value    Sodium 141      Potassium 3.9      Chloride 102      Carbon Dioxide (CO2) 26      Anion Gap 13      Urea Nitrogen 9.9      Creatinine 0.87      Calcium 9.1      Glucose 181 (*)     Alkaline Phosphatase 106      AST 88 (*)      (*)     Protein Total 8.2      Albumin 4.4      Bilirubin Total 0.5      GFR " Estimate >90     TROPONIN T, HIGH SENSITIVITY - Normal    Troponin T, High Sensitivity <6     COVID-19 VIRUS (CORONAVIRUS) BY PCR - Normal    SARS CoV2 PCR Negative     LIPASE - Normal    Lipase 27     CBC WITH PLATELETS AND DIFFERENTIAL    WBC Count 8.9      RBC Count 5.34      Hemoglobin 15.0      Hematocrit 47.0      MCV 88      MCH 28.1      MCHC 31.9      RDW 13.1      Platelet Count 366      % Neutrophils 60      % Lymphocytes 30      % Monocytes 7      % Eosinophils 3      % Basophils 0      % Immature Granulocytes 0      NRBCs per 100 WBC 0      Absolute Neutrophils 5.3      Absolute Lymphocytes 2.7      Absolute Monocytes 0.6      Absolute Eosinophils 0.3      Absolute Basophils 0.0      Absolute Immature Granulocytes 0.0      Absolute NRBCs 0.0          Procedures   none    Emergency Department Course & Assessments:  PSS-3    Date and Time Over the past 2 weeks have you felt down, depressed, or hopeless? Over the past 2 weeks have you had thoughts of killing yourself? Have you ever attempted to kill yourself? When did this last happen? User   04/23/23 0438 yes yes yes within the last 24 hours (including today) RA      C-SSRS ([place])    Date and Time Q1 Wished to be Dead (Past Month) Q2 Suicidal Thoughts (Past Month) Q3 Suicidal Thought Method Q4 Suicidal Intent without Specific Plan Q5 Suicide Intent with Specific Plan Q6 Suicide Behavior (Lifetime) Within the Past 3 Months? RETIRED: Level of Risk per Screen Screening Not Complete User   04/23/23 0604 yes yes yes no yes yes -- -- -- RA   04/23/23 0438 yes yes yes no yes yes -- -- -- RA              Interventions:  Medications - No data to display     Assessments:  0435 I obtained history and examined the patient as noted above.   0610 I rechecked and updated the patient. We discussed going to [facility], and he agreed to the plan of care.     Independent Interpretation (X-rays, CTs, rhythm strip):  No ptx    Consultations/Discussion of Management or  Tests:  None        Social Determinants of Health affecting care:   None    [facility]  [facility]     Impression & Plan    CMS Diagnoses: None      Medical Decision Making:  Kar Bower Jr. is a 28 year old male who presents for evaluation of self-harm and suicidal ideation.  There are no concerns for or signs at this point of ingestion and no concerning findings on the remainder of physical exam. The patient is medically cleared and deemed a good candidate for [facility] for further psychiatric evaluation and care. Given his complaint of palpitations, lab evaluation was undertaken, which was reassuring. They were in agreement and are transferred to the [facility] unit in stable condition.    Diagnosis:    ICD-10-CM    1. Depression, unspecified depression type  F32.A              Scribe Disclosure:  Elaina DELANEY, am serving as a scribe at 4:53 AM on 4/23/2023 to document services personally performed by Tiffani Amin MD based on my observations and the provider's statements to me.     4/23/2023   Tiffani Amin MD Debroux, Karah M, MD  04/23/23 8958

## 2023-04-23 NOTE — DISCHARGE INSTRUCTIONS
Aftercare Plan    Follow up with established providers and supports as scheduled. Continue taking medications as prescribed. Keep working on abstaining from drugs and alcohol. Utilize your [facility] mental health crisis team as needed. They are available 24/7. Contact information is listed below.     Because you do not have current active health insurance we unfortunately were not able to schedule a therapy appointment for you. However, a referral was faxed to [facility] crisis stabilization services. Someone from the Crisis Response Unit will reach out to you within the next couple of days, but you can also call the number below for help.   Crisis Response Unit at [phone]  The Crisis Response Unit is available to all people in the community. Phones are answered by caring staff who know a lot about mental health, housing, shelters, food, employment and transportation.  The Crisis Response Unit is considered a voluntary service. This means you choose if you want help from us.  The Crisis Response Unit can also help with things like:  -Mental health crisis assessments  -Access to urgent psychiatry and therapy  -Family education and support  -Crisis phone support  -Referrals to community resources  -Crisis Stabilization Services  Crisis Stabilization Services is a short-term service for people who need extra help getting through the difficult time after a crisis. Crisis Stabilization Services is a voluntary service.     If I am feeling unsafe or I am in a crisis, I will:   Contact my established care providers   Call the National Suicide Prevention Lifeline: 453.510.9893   Go to the nearest emergency room   Call 216     Warning signs that I or other people might notice when a crisis is developing for me: changes to sleep, appetite or mood, increased anger, agitation or irritability, feeling depressed or hopeless, spending more time alone or talking less, increased crying, decreased  productivity, seeing or hearing things that aren't there, thoughts of not wanting to live anymore or of actually killing myself, thoughts of hurting others    Things I am able to do on my own to cope or help me feel better: watching a favorite tv show or movie, listening to music I enjoy, going outside and breathing fresh air, going for a walk or exercising, taking a shower or bath, a cold or hot beverage, a healthy snack, drawing/coloring/painting, journaling, singing or dancing, deep breathing     I can try practicing square breathing when I begin to feel anxious - inhale through the nose for the count of 4 and the first line on the square. Exhale through the mouth for the count of 4 for the second line of the square. Repeat to complete the square. Repeat the square as many times as needed.    I can also use my five senses to practice mindfulness and grounding. What are five things I can see, four things I can hear, three things I can feel, two things I can smell, and one thing I can taste.     Things that I am able to do with others to cope or help me feel better: sometimes just talking or spending time with someone else, sharing a meal or having coffee, watching a movie or playing a game, going for a walk or exercising    I can also use community resources including mental health hotlines, [facility] crisis teams, or apps.     Things I can use or do for distraction: movies/tv, music, reading, games, drawing/coloring/painting or other art, essential oils, exercise, cleaning/organizing, puzzles, crossword puzzles, word search, Sudoku       I can also download a meditation or relaxation roz, like Calm, Headspace, or Insight Timer (all three offer a free version)    Changes I can make to support my mental health and wellness: Attend scheduled mental health therapy and psychiatric appointments. Take my medications as prescribed. Maintain a daily schedule/routine. Abstain from all mood altering substances, including  "drugs, alcohol, or medications not currently prescribed to me. Implement a self-care routine.      People in my life that I can ask for help: friends or family, trusted teachers/staff/colleagues, trusted members of my community or place of [employer], mental health crisis lines, or 911    Your [facility] has a mental health crisis team you can call 24/7: [facility], [phone]    Other things that are important when I m in crisis: to remember that the feelings I am having right now are temporary, and it won't feel like this forever, and that it is okay and important to ask for help    Crisis Lines  Crisis Text Line  Text 377432  You will be connected with a trained live crisis counselor to provide support.    Por espanol, texto  BANDAR a 436134 o texto a 442-AYUDAME en WhatsApp    National Hope Line  1.800.SUICIDE [6655077]      Community Resources  Fast Tracker  Linking people to mental health and substance use disorder resources  fasttrackIbexis Technologiesn.org     [place] Mental Health Warm Line  Peer to peer support  Monday thru Saturday, 12 pm to 10 pm  188.143.6761 or 1.514.448.7242  Text \"Support\" to 70605    National [place] on Mental Illness (ELEONORA)  933.526.5528 or 1.888.ELEONORA.HELPS      Mental Health Apps  My3  https://myOvercartpp.org/    VirtualHopeBox  https://[employer].org/apps/virtual-hope-box/      Additional Information  Today you were seen by a licensed mental health professional through Triage and Transition services, [facility] ([facility])  for a crisis assessment in the Emergency Department at [facility].  It is recommended that you follow up with your established providers (psychiatrist, mental health therapist, and/or primary care doctor - as relevant) as soon as possible. Coordinators from [facility] will be calling you in the next 24-48 hours to ensure that you have the resources you need.  You can also contact [facility] coordinators directly at [phone]. You may have been scheduled " for or offered an appointment with a mental health provider. [facility] maintains an extensive network of licensed behavioral health providers to connect patients with the services they need.  We do not charge providers a fee to participate in our referral network.  We match patients with providers based on a patient's specific needs, insurance coverage, and location.  Our first effort will be to refer you to a provider within your care system, and will utilize providers outside your care system as needed.

## 2023-04-23 NOTE — ED NOTES
Bed: ED25  Expected date:   Expected time:   Means of arrival:   Comments:  MIKE Ventura 28M SI/ ETOH

## 2023-04-23 NOTE — PROGRESS NOTES
Name Kar Bower [id number]    1995  Date: 2023     Time   11:00-11:10am  Location of patient [facility] ([facility]) Location of doctor:  [place], [place]    Consulted by       Spoken with Leonora Rodríguez RN  History obtained from  Leonora  This evaluation was conducted via video telepsychiatry with the assistance of onsite staff.     HPI      Kar Bower, 27 y/o  male with EtOH use was consulted for his SI when intoxicated that has resolved now that he s sober.  Per Leonora Rodríguez,  drinking heavily last night, comes in with suicidal ideation with threat to cut self with knife, barely any surface scratches.  Now that he s sober, he wants to be discharged.  Not scoring on CIWA.  Not suicidal now sober and wants to be discharged.  Pause slightly elevated at 110.  Hx of a month of shortness of breath.  At [place] [facility], a [facility] hospital, a month ago without any follow up.      Girlfriend will take him back.  She thought he s ok for discharge.. needs help with drinking  Michelle Key (Significant other)   [phone] (Mobile    After telling me about the case, the NP, Edgard arrived and Leonora, nurse, told me they don t need telepsych anymore.  Change to phone consult    STRESS:    job,             Thank you for this consult.              Raul Hebert,   Psychiatrist
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
## 2023-04-23 NOTE — ED NOTES
28 year old male with history of GERD and anxiety received from ED due to suicidal ideation.  Reports relationship stress and cut forearm as a cry for help.  Reports heavy drinking daily.  Denies HI.  Nursing and risk assessments completed.  Assessments reviewed with [facility] and on-call physician.  Admission information reviewed with patient.  Patient given a tour of [facility] and instructions on using the facility.  Questions regarding [facility] addressed.  Pt safety search completed.
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
## 2023-04-23 NOTE — ED NOTES
NP here and states that pt can go AMA.AVS will give pt resources through the [facility] Pt left AMA 10:30

## 2023-05-02 ENCOUNTER — HOSPITAL ENCOUNTER (EMERGENCY)
Facility: CLINIC | Age: 28
Discharge: HOME | End: 2023-05-02
Attending: PHYSICIAN ASSISTANT | Admitting: PHYSICIAN ASSISTANT

## 2023-05-02 VITALS
RESPIRATION RATE: 20 BRPM | WEIGHT: 300 LBS | OXYGEN SATURATION: 100 % | BODY MASS INDEX: 42 KG/M2 | DIASTOLIC BLOOD PRESSURE: 117 MMHG | HEART RATE: 115 BPM | SYSTOLIC BLOOD PRESSURE: 151 MMHG | TEMPERATURE: 98.2 F | HEIGHT: 71 IN

## 2023-05-02 DIAGNOSIS — R51.9 ACUTE NONINTRACTABLE HEADACHE, UNSPECIFIED HEADACHE TYPE: ICD-10-CM

## 2023-05-02 DIAGNOSIS — H61.22 IMPACTED CERUMEN OF LEFT EAR: ICD-10-CM

## 2023-05-02 LAB — GLUCOSE BLDC GLUCOMTR-MCNC: 122 MG/DL (ref 70–99)

## 2023-05-02 PROCEDURE — 82962 GLUCOSE BLOOD TEST: CPT

## 2023-05-02 PROCEDURE — 99283 EMERGENCY DEPT VISIT LOW MDM: CPT

## 2023-05-02 ASSESSMENT — ACTIVITIES OF DAILY LIVING (ADL): ADLS_ACUITY_SCORE: 33

## 2023-05-02 ASSESSMENT — ENCOUNTER SYMPTOMS: HEADACHES: 1

## 2023-05-02 NOTE — DISCHARGE INSTRUCTIONS
Discharge Instructions  Headache    You were seen today for a headache. Headaches may be caused by many different things such as muscle tension, sinus inflammation, anxiety and stress, having too little sleep, too much alcohol, some medical conditions or injury. You may have a migraine, which is caused by changes in the blood vessels in your head.  The symptoms you are describing sound like they may be related to the wax impacted and touching  your eardrum. At this time your provider does not find that your headache is a sign of anything dangerous or life-threatening.  However, sometimes the signs of serious illness do not show up right away.      Generally, every Emergency Department visit should have a follow-up clinic visit with either a primary or a specialty clinic/provider. Please follow-up as instructed by your emergency provider today.    Return to the Emergency Department if:  You get a new fever of 100.4 F or higher.  Your headache gets much worse.  You get a stiff neck with your headache.  You get a new headache that is significantly different or worse than headaches you have had before.  You are vomiting (throwing up) and cannot keep food or water down.  You have blurry or double vision or other problems with your eyes.  You have a new weakness on one side of your body.  You have difficulty with balance which is new.  You or your family thinks you are confused.  You have a seizure.    What can I do to help myself?  Pain medications - You may take a pain medication such as Tylenol  (acetaminophen), Advil , Motrin  (ibuprofen) or Aleve  (naproxen).  Take a pain reliever as soon as you notice symptoms.  Starting medications as soon as you start to have symptoms may lessen the amount of pain you have.  Relaxing in a quiet, dark room may help.  Get enough sleep and eat meals regularly.  You may need to watch for certain foods or other things which may trigger your headaches.  Keeping a journal of your  headaches and possible triggers may help you and your primary provider to identify things which you should avoid which may be causing your headaches.  If you were given a prescription for medicine here today, be sure to read all of the information (including the package insert) that comes with your prescription.  This will include important information about the medicine, its side effects, and any warnings that you need to know about.  The pharmacist who fills the prescription can provide more information and answer questions you may have about the medicine.  If you have questions or concerns that the pharmacist cannot address, please call or return to the Emergency Department.   Remember that you can always come back to the Emergency Department if you are not able to see your regular provider in the amount of time listed above, if you get any new symptoms, or if there is anything that worries you.

## 2023-05-02 NOTE — ED TRIAGE NOTES
"Pt presents for evaluation of pain on the left side of the head associated with a \"thumping sound\" in the left ear. Pain can move to behind the left eye as well. intermittent blurred vision with sensitivity to light. Has been happening for the last few days and has been intermittent. Pt also feeling lightheaded at times. Has not tried any meds for pain.       "

## 2023-05-02 NOTE — ED PROVIDER NOTES
"  History     Chief Complaint:  Headache       HPI   Kar Bower Jr. is a 28 year old male who presents with a pain in left side of his head as well as a thumping noise in his left ear intermittently with shooting pains that go toward his left eye and left posterior scalp at times.  He notes his symptoms are sometimes triggered when he yawns in the morning.  He notes occasionally he has spasms of his left eye that then caused tearing and blurry vision but this goes away quickly.  He denies discharge from his ear.  He has slightly decreased hearing on that side.  He denies fevers.  He denies trauma to his ears.  He does not use Q-tips anymore as he used to any because but cause trauma so he discontinued use.  He denies one-sided weakness, slurred speech, confusion, or any other neurologic symptoms.      Independent Historian:   None - Patient Only    Review of External Notes: I reviewed outpatient imaging.  No head imaging.    ROS:  Review of Systems   Eyes: Positive for visual disturbance.   Neurological: Positive for headaches.   All other systems reviewed and are negative.      Allergies:  Sulfa Antibiotics     Medications:    No daily medications    Past Medical History:    Past Medical History:   Diagnosis Date     Anxiety      Social History:  He is here alone  PCP: No Ref-Primary, Physician     Physical Exam     Patient Vitals for the past 24 hrs:   BP Temp Temp src Pulse Resp SpO2 Height Weight   05/02/23 1229 (!) 151/117 98.2  F (36.8  C) Oral 115 20 100 % 1.803 m (5' 11\") 136.1 kg (300 lb)        Physical Exam   General: Adult male sitting upright  Eyes: PERRL, Conjunctive within normal limits.  EOMI.  ENT: Bilateral cerumen, impacted on the left.  Right TM normal in appearance.  Moist mucous membranes, oropharynx clear.   CV: Regular rate and rhythm  Resp: Normal respiratory effort.  MSK: Moves all extremities equally.  Ambulatory.  Skin: Warm and dry. No rashes or lesions or ecchymoses on visible " skin.  Neuro: Alert and oriented. Responds appropriately to all questions and commands. No focal findings appreciated. Normal muscle tone.  No facial asymmetry.  Speech is normal.  Psych: Normal mood and affect. Pleasant.    Emergency Department Course     Emergency Department Course & Assessments:    Assessments:  I examined the patient.  I discussed plan.  Technician performed cerumen disimpaction with success.  I reassessed the patient.  He notes improvement in symptoms.  No new concerns.        Social Determinants of Health affecting care:   None    Disposition:  The patient was discharged to home.     Impression & Plan he notes his symptoms are worse when he yawns     Medical Decision Making:  Kar Bower is a 28-year-old male who presents emergency department with left-sided intermittent headache and sharp shooting pains as well as a thumping in his left ear which is his primary symptom of concern.  He has evidence of cerumen impaction on the left which I suspect was impacting his left TM movement and causing intermittent thumping.  I considered TMJ, trigeminal neuropathies, less likely brain mass or intracranial pathology.  Given his resolution after cerumen disimpaction, these latter seem unlikely.  He is content with avoiding a CT scan at this time.  He is will follow-up with his primary care provider consider ENT if he has ongoing symptoms.  He return to the emergency department worsening.  All questions were answered prior to discharge.    Diagnosis:    ICD-10-CM    1. Acute nonintractable headache, unspecified headache type  R51.9       2. Impacted cerumen of left ear  H61.22             5/2/2023   Medina Nieves MD Jonkman, Tracy Dianne, MD  05/02/23 1829

## 2023-05-10 ENCOUNTER — HOSPITAL ENCOUNTER (EMERGENCY)
Facility: CLINIC | Age: 28
Discharge: HOME | End: 2023-05-10
Attending: EMERGENCY MEDICINE | Admitting: EMERGENCY MEDICINE

## 2023-05-10 VITALS
TEMPERATURE: 96.8 F | DIASTOLIC BLOOD PRESSURE: 114 MMHG | BODY MASS INDEX: 49.87 KG/M2 | HEART RATE: 99 BPM | SYSTOLIC BLOOD PRESSURE: 147 MMHG | OXYGEN SATURATION: 95 % | RESPIRATION RATE: 18 BRPM | WEIGHT: 315 LBS

## 2023-05-10 DIAGNOSIS — R03.0 ELEVATED BLOOD PRESSURE READING WITHOUT DIAGNOSIS OF HYPERTENSION: ICD-10-CM

## 2023-05-10 DIAGNOSIS — H93.8X2 POUNDING NOISE IN EAR, LEFT: ICD-10-CM

## 2023-05-10 PROCEDURE — 93005 ELECTROCARDIOGRAM TRACING: CPT

## 2023-05-10 PROCEDURE — 99283 EMERGENCY DEPT VISIT LOW MDM: CPT

## 2023-05-10 PROCEDURE — 93005 ELECTROCARDIOGRAM TRACING: CPT | Mod: RTG

## 2023-05-10 ASSESSMENT — ENCOUNTER SYMPTOMS
SORE THROAT: 0
FACIAL SWELLING: 0
SINUS PAIN: 0
LIGHT-HEADEDNESS: 1
COUGH: 0
SINUS PRESSURE: 0
NERVOUS/ANXIOUS: 1
HEADACHES: 0

## 2023-05-10 ASSESSMENT — ACTIVITIES OF DAILY LIVING (ADL): ADLS_ACUITY_SCORE: 33

## 2023-05-11 LAB
ATRIAL RATE - MUSE: 79 BPM
DIASTOLIC BLOOD PRESSURE - MUSE: NORMAL MMHG
INTERPRETATION ECG - MUSE: NORMAL
P AXIS - MUSE: 43 DEGREES
PR INTERVAL - MUSE: 152 MS
QRS DURATION - MUSE: 90 MS
QT - MUSE: 358 MS
QTC - MUSE: 410 MS
R AXIS - MUSE: 48 DEGREES
SYSTOLIC BLOOD PRESSURE - MUSE: NORMAL MMHG
T AXIS - MUSE: 41 DEGREES
VENTRICULAR RATE- MUSE: 79 BPM

## 2023-05-11 NOTE — DISCHARGE INSTRUCTIONS
Regarding the ear I think there may be some extra fluid there.  I would start taking something like Sudafed but use the one specifically for high blood pressure.  I would call ENT to arrange a follow-up appointment and they can discuss if you need further testing and what the management strategy will be    Regarding your blood pressure, it is too high and you likely need to be on blood pressure medications.  I would purchase a blood pressure cuff and take your blood pressure twice per day.  Keep a log of this and note with a log if you are having the pounding or not.  You need to arrange appointment with a primary care provider to discuss your blood pressure.    If you have a new symptom or worsening including, but not limited to, loss of vision, severe headache, or chest pain, you should return to the emergency department.

## 2023-05-11 NOTE — ED TRIAGE NOTES
Pt arrives with concerns for reoccurring ear pain since last ER visit. Pt states he was told to return if pain continued, pt states he has had more pain and numbness that started yesterday. ABCs intact and AOx4.     Triage Assessment     Row Name 05/10/23 6291       Triage Assessment (Adult)    Airway WDL WDL       Respiratory WDL    Respiratory WDL WDL       Cardiac WDL    Cardiac WDL WDL

## 2023-05-11 NOTE — ED PROVIDER NOTES
"  History     Chief Complaint:  Ear pounding     The history is provided by the patient.      Kar Bower Jr. is a 28 year old male who presents with ear pounding.  Kar was seen in this emergency department 8 days ago for thumping sensation in his left ear and intermittent pain that improved after cerumen disimpaction so he was discharged.  He tells me his symptoms never completely resolved and last night they seem to worsen.  He describes a pounding sensation in the left ear that is intermittent and when it is not present he has tinnitus.  He describes occasional pain behind his ear, to the left of his nose, or in his left temple.  The ear itself is not painful.  He also describes some bilateral cheek numbness yesterday.  He describes intermittent lightheadedness and blurry vision but does admit to anxiety.  He denies change in hearing, sinus congestion, sore throat, cough, or symptoms on the contralateral side.    Notably, he does not have a primary care provider and did not follow-up between emergency department visits.    Kar expresses concern for a \"clogged artery\", stroke, or tumor.    Independent Historian:   None - Patient Only    Review of External Notes: Patient was seen on 5/2/2023 in [facility] ED with left head pain and thumping noise in his hear. He had a cerumen impaction and when they cleared it, he felt better.     ROS:  Review of Systems   HENT: Positive for tinnitus. Negative for congestion, ear discharge, ear pain, facial swelling, hearing loss, sinus pressure, sinus pain and sore throat.    Eyes: Positive for visual disturbance.   Respiratory: Negative for cough.    Neurological: Positive for light-headedness. Negative for headaches.   Psychiatric/Behavioral: The patient is nervous/anxious.    All other systems reviewed and are negative.    Allergies:  Sulfa Antibiotics     Medications:    The patient is not currently taking any prescribed medications.    Past Medical History:    Anxiety  Major " depressive disorder  Polysubstance abuse  Acute alcoholism   Pneumonia     Social History:  Patient arrived via car.  Patient presents with girlfriend.      Physical Exam     Patient Vitals for the past 24 hrs:   BP Temp Temp src Pulse Resp SpO2 Weight   05/10/23 2218 (!) 147/114 -- -- 99 -- 95 % --   05/10/23 1910 (!) 167/115 96.8  F (36  C) Tympanic 104 18 98 % (!) 162.2 kg (357 lb 9.4 oz)      Physical Exam  General: Well-developed and obese. Well appearing young  man. Cooperative.  Head:  Atraumatic.  Eyes:  Conjunctivae, lids, and sclerae are normal.  ENT:    Normal nose. Moist mucous membranes.  Right TM obscured by cerumen.  There is mild irritation of the left external auditory canal without otitis media.  TM pearlescent.  Neck:  Supple. Normal range of motion.  CV:  Regular rate and rhythm. Normal heart sounds with no murmurs, rubs, or gallops detected.  Resp:  No respiratory distress. Clear to auscultation bilaterally without decreased breath sounds, wheezing, rales, or rhonchi.  GI:  Soft. Non-distended. Non-tender.    MS:  Normal ROM.   Skin:  Warm. Non-diaphoretic. No pallor.  Neuro:  Awake. A&Ox3. Normal strength.  PERRL.  Psych: Normal mood and affect. Normal speech.  Vitals reviewed.    Emergency Department Course   EKG  Indication: Hypertension   Time: 2032  Rate 79 bpm. NH interval 152. QRS duration 90. QT/QTc 358/410.   Sinus rhythm with marked sinus arrhythmia   No acute ST changes.  No significant change as compared to prior, dated 07/29/2020.    Emergency Department Course & Assessments:  Independent Interpretation (X-rays, CTs, rhythm strip):  Not applicable    Assessments/Consultations/Discussion of Management or Tests:  ED Course as of 05/11/23 0100   Wed May 10, 2023   2147 I obtained patient's history and examined as noted above.      Social Determinants of Health affecting care:   Patient does not currently have a primary care provider.   Supportive girlfriend.      Disposition:  The patient was discharged.     Impression & Plan    Medical Decision Making:  Kar is a 28-year-old man presenting with a pounding sensation in his ear.  He was seen for this 8 days ago and had improvement after cerumen disimpaction but his symptoms never resolved and seem to worsen last night.  When he is not having this pounding sensation he has tinnitus.  He also has occasional pain and intermittent lightheadedness as per HPI.  Kar is very anxious as to the possible causes of this, particularly about possibility of stroke or tumor.  He appears well with moderate hypertension.  His ear exam is grossly unremarkable with some mild irritation likely due to recent cerumen disimpaction.  He has no neurologic deficits.  His EKG is reassuring without acute ST changes or arrhythmias.    I spent a long time speaking with the patient and his significant other about testing in the ED.  I really think it is unlikely to be fruitful.  I strongly suspect he has extra fluid on the ear that is contributing to his symptoms which are compounded by anxiety.  Serum studies are unlikely to .  Similarly, head CT is unlikely be fruitful and risks of radiation likely outweigh benefit.  Without focal neurologic findings, MRI can safely be deferred.  I have recommended he purchase over-the-counter decongestants (with caution for high blood pressure as he has elevated readings here) and follow-up with ENT. He understands directed testing by ENT is more likely to provide definitive diagnosis without unnecessary testing.  Because he has hypertension here I have recommended he purchase a sphygmomanometer and take his blood pressure twice a day.  I recommended he arrange an appointment to establish care with a primary care provider and take this log with him.  This will help determine if he requires antihypertensives and at his follow-up he can discuss any residual symptoms that would warrant nonemergent  testing.  He and his girlfriend understand if he is having worsening or new symptoms he should return immediately to the emergency department.  All questions answered.  Amenable to discharge.    Diagnosis:    ICD-10-CM    1. Pounding noise in ear, left  H93.8X2       2. Elevated blood pressure reading without diagnosis of hypertension  R03.0           Scribe Disclosure:  I, Mayra Althea, am serving as a scribe at 9:50 PM on 5/10/2023 to document services personally performed by Lauren Conner MD based on my observations and the provider's statements to me.     5/10/2023   Lauren Conner MD Dixson, Kylie S, MD  05/28/23 2139

## 2023-06-26 ENCOUNTER — APPOINTMENT (OUTPATIENT)
Dept: CT IMAGING | Facility: CLINIC | Age: 28
End: 2023-06-26
Attending: EMERGENCY MEDICINE

## 2023-06-26 ENCOUNTER — HOSPITAL ENCOUNTER (EMERGENCY)
Facility: CLINIC | Age: 28
Discharge: HOME | End: 2023-06-26
Attending: EMERGENCY MEDICINE | Admitting: EMERGENCY MEDICINE

## 2023-06-26 VITALS
BODY MASS INDEX: 49.9 KG/M2 | DIASTOLIC BLOOD PRESSURE: 63 MMHG | RESPIRATION RATE: 20 BRPM | WEIGHT: 315 LBS | HEART RATE: 94 BPM | SYSTOLIC BLOOD PRESSURE: 103 MMHG | TEMPERATURE: 98.2 F | OXYGEN SATURATION: 97 %

## 2023-06-26 DIAGNOSIS — R05.1 ACUTE COUGH: ICD-10-CM

## 2023-06-26 DIAGNOSIS — R42 LIGHTHEADEDNESS: ICD-10-CM

## 2023-06-26 LAB
ALBUMIN SERPL BCG-MCNC: 3.7 G/DL (ref 3.5–5.2)
ALP SERPL-CCNC: 90 U/L (ref 40–129)
ALT SERPL W P-5'-P-CCNC: 90 U/L (ref 0–70)
ANION GAP SERPL CALCULATED.3IONS-SCNC: 9 MMOL/L (ref 7–15)
AST SERPL W P-5'-P-CCNC: 71 U/L (ref 0–45)
ATRIAL RATE - MUSE: 94 BPM
BASOPHILS # BLD AUTO: 0 10E3/UL (ref 0–0.2)
BASOPHILS NFR BLD AUTO: 0 %
BILIRUB SERPL-MCNC: 0.7 MG/DL
BUN SERPL-MCNC: 7.5 MG/DL (ref 6–20)
CALCIUM SERPL-MCNC: 8.7 MG/DL (ref 8.6–10)
CHLORIDE SERPL-SCNC: 101 MMOL/L (ref 98–107)
CREAT SERPL-MCNC: 0.93 MG/DL (ref 0.67–1.17)
DEPRECATED HCO3 PLAS-SCNC: 27 MMOL/L (ref 22–29)
DIASTOLIC BLOOD PRESSURE - MUSE: NORMAL MMHG
EOSINOPHIL # BLD AUTO: 0.3 10E3/UL (ref 0–0.7)
EOSINOPHIL NFR BLD AUTO: 3 %
ERYTHROCYTE [DISTWIDTH] IN BLOOD BY AUTOMATED COUNT: 12.5 % (ref 10–15)
FLUAV RNA SPEC QL NAA+PROBE: NEGATIVE
FLUBV RNA RESP QL NAA+PROBE: NEGATIVE
GFR SERPL CREATININE-BSD FRML MDRD: >90 ML/MIN/1.73M2
GLUCOSE SERPL-MCNC: 168 MG/DL (ref 70–99)
HCT VFR BLD AUTO: 42.2 % (ref 40–53)
HGB BLD-MCNC: 13.4 G/DL (ref 13.3–17.7)
HOLD SPECIMEN: NORMAL
IMM GRANULOCYTES # BLD: 0 10E3/UL
IMM GRANULOCYTES NFR BLD: 0 %
INTERPRETATION ECG - MUSE: NORMAL
LYMPHOCYTES # BLD AUTO: 2.3 10E3/UL (ref 0.8–5.3)
LYMPHOCYTES NFR BLD AUTO: 25 %
MCH RBC QN AUTO: 28.6 PG (ref 26.5–33)
MCHC RBC AUTO-ENTMCNC: 31.8 G/DL (ref 31.5–36.5)
MCV RBC AUTO: 90 FL (ref 78–100)
MONOCYTES # BLD AUTO: 0.5 10E3/UL (ref 0–1.3)
MONOCYTES NFR BLD AUTO: 6 %
NEUTROPHILS # BLD AUTO: 6.1 10E3/UL (ref 1.6–8.3)
NEUTROPHILS NFR BLD AUTO: 66 %
NRBC # BLD AUTO: 0 10E3/UL
NRBC BLD AUTO-RTO: 0 /100
P AXIS - MUSE: 36 DEGREES
PLATELET # BLD AUTO: 326 10E3/UL (ref 150–450)
POTASSIUM SERPL-SCNC: 4.1 MMOL/L (ref 3.4–5.3)
PR INTERVAL - MUSE: 164 MS
PROCALCITONIN SERPL IA-MCNC: 0.06 NG/ML
PROT SERPL-MCNC: 7.5 G/DL (ref 6.4–8.3)
QRS DURATION - MUSE: 92 MS
QT - MUSE: 346 MS
QTC - MUSE: 432 MS
R AXIS - MUSE: 35 DEGREES
RBC # BLD AUTO: 4.68 10E6/UL (ref 4.4–5.9)
RSV RNA SPEC NAA+PROBE: NEGATIVE
SARS-COV-2 RNA RESP QL NAA+PROBE: NEGATIVE
SODIUM SERPL-SCNC: 137 MMOL/L (ref 136–145)
SYSTOLIC BLOOD PRESSURE - MUSE: NORMAL MMHG
T AXIS - MUSE: 34 DEGREES
VENTRICULAR RATE- MUSE: 94 BPM
WBC # BLD AUTO: 9.1 10E3/UL (ref 4–11)

## 2023-06-26 PROCEDURE — 71275 CT ANGIOGRAPHY CHEST: CPT

## 2023-06-26 PROCEDURE — 258N000003 HC RX IP 258 OP 636: Performed by: EMERGENCY MEDICINE

## 2023-06-26 PROCEDURE — 93005 ELECTROCARDIOGRAM TRACING: CPT

## 2023-06-26 PROCEDURE — 84145 PROCALCITONIN (PCT): CPT | Performed by: EMERGENCY MEDICINE

## 2023-06-26 PROCEDURE — 250N000011 HC RX IP 250 OP 636: Performed by: EMERGENCY MEDICINE

## 2023-06-26 PROCEDURE — 80053 COMPREHEN METABOLIC PANEL: CPT | Performed by: EMERGENCY MEDICINE

## 2023-06-26 PROCEDURE — 85025 COMPLETE CBC W/AUTO DIFF WBC: CPT | Performed by: EMERGENCY MEDICINE

## 2023-06-26 PROCEDURE — 96360 HYDRATION IV INFUSION INIT: CPT | Mod: 59

## 2023-06-26 PROCEDURE — 36415 COLL VENOUS BLD VENIPUNCTURE: CPT | Performed by: EMERGENCY MEDICINE

## 2023-06-26 PROCEDURE — 99285 EMERGENCY DEPT VISIT HI MDM: CPT | Mod: 25

## 2023-06-26 PROCEDURE — 87637 SARSCOV2&INF A&B&RSV AMP PRB: CPT | Performed by: EMERGENCY MEDICINE

## 2023-06-26 RX ORDER — FAMOTIDINE 20 MG/1
20 TABLET, FILM COATED ORAL 2 TIMES DAILY
Qty: 30 TABLET | Refills: 0 | Status: SHIPPED | OUTPATIENT
Start: 2023-06-26

## 2023-06-26 RX ORDER — ALBUTEROL SULFATE 90 UG/1
2 AEROSOL, METERED RESPIRATORY (INHALATION) EVERY 6 HOURS PRN
Qty: 18 G | Refills: 0 | Status: SHIPPED | OUTPATIENT
Start: 2023-06-26

## 2023-06-26 RX ORDER — SUCRALFATE 1 G/1
1 TABLET ORAL 4 TIMES DAILY
Qty: 30 TABLET | Refills: 0 | Status: SHIPPED | OUTPATIENT
Start: 2023-06-26

## 2023-06-26 RX ORDER — DEXAMETHASONE 1 MG
1 TABLET ORAL ONCE
Qty: 1 TABLET | Refills: 0 | Status: SHIPPED | OUTPATIENT
Start: 2023-06-26 | End: 2023-07-27

## 2023-06-26 RX ORDER — IOPAMIDOL 755 MG/ML
500 INJECTION, SOLUTION INTRAVASCULAR ONCE
Status: COMPLETED | OUTPATIENT
Start: 2023-06-26 | End: 2023-06-26

## 2023-06-26 RX ADMIN — IOPAMIDOL 80 ML: 755 INJECTION, SOLUTION INTRAVENOUS at 13:52

## 2023-06-26 RX ADMIN — SODIUM CHLORIDE 100 ML: 9 INJECTION, SOLUTION INTRAVENOUS at 13:53

## 2023-06-26 ASSESSMENT — ACTIVITIES OF DAILY LIVING (ADL): ADLS_ACUITY_SCORE: 35

## 2023-06-26 NOTE — DISCHARGE INSTRUCTIONS
I recommend over-the-counter cough syrup, you can use the albuterol to see if this helps with your cough especially when having a coughing fits to the point you get lightheaded should also take the Decadron as this can help with any mild lung inflammation.  As we reviewed, this could also be caused by acid reflux especially if your symptoms are worse at night, you can take the Pepcid and Carafate to see if this helps keep in mind they do not work right away they can take several days to be effective.  I also recommend using Afrin nasal spray and/or Sudafed at night to help dry your nose out in case some of this is just from postnasal drip.  You should make appoint to follow-up in your primary care clinic for recheck.  You should return here should you develop worsening symptoms.

## 2023-06-26 NOTE — ED TRIAGE NOTES
Pt reports that he has had a wk of a productive cough and SOB that worsens with exertion, pt reports that there is now some pink tinged sputum. PT reports dizziness as well. VSS and ABC's intact

## 2023-06-26 NOTE — ED PROVIDER NOTES
History     Chief Complaint:  Cough and Dizziness    HPI   Kar Bower Jr. is a 28 year old male with a history of anxiety who presents with a cough and dizziness. The patient reports having a cough for 1.5 weeks. States that initially he was coughing green mucus, but reports that it is now pink and bloody. Notes that he gets dizzy when he has a coughing attack and that his symptoms are worse when he is laying down at night. Adds that he has some neck and upper chest discomfort when he coughs. Reports that he has GERD and takes Tums and Maalox for symptom control. Denies shortness of breath, syncope, leg swelling, fever, chest pain, nasal congestion, sick exposures, recent travel, and history of asthma.    Independent Historian:   None - Patient Only    Review of External Notes:   None     Medications:    The patient is currently on no regular medications.    Past Medical History:    Anxiety  GERD    Physical Exam     Patient Vitals for the past 24 hrs:   BP Temp Temp src Pulse Resp SpO2 Weight   06/26/23 1330 -- -- -- -- -- 95 % --   06/26/23 1315 (!) 144/92 -- -- -- -- -- --   06/26/23 1310 (!) 166/94 98.2  F (36.8  C) Temporal 105 20 99 % (!) 162.3 kg (357 lb 12.9 oz)      Physical Exam  Constitutional: Alert, attentive, GCS 15  HENT:    Nose: Nose normal.    Mouth/Throat: Oropharynx is clear, mucous membranes are moist  Eyes: EOM are normal, anicteric, conjugate gaze  CV: regular rate and rhythm; no murmurs  Chest: Effort normal and breath sounds clear without wheezing or rales, symmetric bilaterally   GI:  non tender. No distension. No guarding or rebound.    MSK: No LE edema, no tenderness to palpation of BLE.  Neurological: Alert, attentive, moving all extremities equally.   Skin: Skin is warm and dry.    Emergency Department Course   ECG  ECG taken at 1320, ECG read at 1345  Normal sinus rhythm    No change as compared to prior, dated 5/10/23.  Rate 94 bpm. LA interval 164 ms. QRS duration 92 ms. QT/QTc  346/432 ms. P-R-T axes 36 35 34.     Imaging:  CT Chest Pulmonary Embolism w Contrast   Final Result   IMPRESSION:   1.  No acute process demonstrated in the chest.      BERTO HERNANDEZ MD            SYSTEM ID:  [id number]         Report per radiology    Laboratory:  Labs Ordered and Resulted from Time of ED Arrival to Time of ED Departure   COMPREHENSIVE METABOLIC PANEL - Abnormal       Result Value    Sodium 137      Potassium 4.1      Chloride 101      Carbon Dioxide (CO2) 27      Anion Gap 9      Urea Nitrogen 7.5      Creatinine 0.93      Calcium 8.7      Glucose 168 (*)     Alkaline Phosphatase 90      AST 71 (*)     ALT 90 (*)     Protein Total 7.5      Albumin 3.7      Bilirubin Total 0.7      GFR Estimate >90     PROCALCITONIN - Abnormal    Procalcitonin 0.06 (*)    CBC WITH PLATELETS AND DIFFERENTIAL    WBC Count 9.1      RBC Count 4.68      Hemoglobin 13.4      Hematocrit 42.2      MCV 90      MCH 28.6      MCHC 31.8      RDW 12.5      Platelet Count 326      % Neutrophils 66      % Lymphocytes 25      % Monocytes 6      % Eosinophils 3      % Basophils 0      % Immature Granulocytes 0      NRBCs per 100 WBC 0      Absolute Neutrophils 6.1      Absolute Lymphocytes 2.3      Absolute Monocytes 0.5      Absolute Eosinophils 0.3      Absolute Basophils 0.0      Absolute Immature Granulocytes 0.0      Absolute NRBCs 0.0     INFLUENZA A/B, RSV, & SARS-COV2 PCR      Emergency Department Course & Assessments:     Interventions:  Medications   0.9% sodium chloride BOLUS (100 mLs Intravenous $New Bag 6/26/23 1353)   iopamidol (ISOVUE-370) solution 500 mL (80 mLs Intravenous $Given 6/26/23 1352)        Assessments:  1335 I obtained history and examined the patient as noted above.     Independent Interpretation (X-rays, CTs, rhythm strip):  I reviewed chest CT which shows no pulmonary embolism.     Consultations/Discussion of Management or Tests:  None        Social Determinants of Health affecting care:    None    Disposition:  The patient was discharged to home.     Impression & Plan    Medical Decision Makin-year-old male past medical history significant for anxiety presenting for evaluation of 1 and half weeks of cough associated with dizziness while having coughing fits particular worse at night not associated with fever.  Was noted to be tachycardic on arrival, complaining of mild hemoptysis prompting CT PE angiogram which was negative for PE or pulmonary infiltrates.  Screening labs are also unremarkable with low suspicion for bacterial etiology.  Given occurs mostly at night with supine position I suspect postnasal drip or potentially acid reflux.  We will give him albuterol inhaler as a precaution, single dose of Decadron as well as Carafate and Pepcid, recommended over-the-counter decongestant versus Afrin and PCP follow-up.  Return precautions reviewed and he was discharged.      Diagnosis:    ICD-10-CM    1. Acute cough  R05.1       2. Lightheadedness  R42          Discharge Medications:  New Prescriptions    ALBUTEROL (PROAIR HFA/PROVENTIL HFA/VENTOLIN HFA) 108 (90 BASE) MCG/ACT INHALER    Inhale 2 puffs into the lungs every 6 hours as needed for shortness of breath, wheezing or cough    DEXAMETHASONE (DECADRON) 1 MG TABLET    Take 1 tablet (1 mg) by mouth once for 1 dose    FAMOTIDINE (PEPCID) 20 MG TABLET    Take 1 tablet (20 mg) by mouth 2 times daily    SUCRALFATE (CARAFATE) 1 GM TABLET    Take 1 tablet (1 g) by mouth 4 times daily      Camden Messer MD  [employer]  3:32 PM 23     Scribe Disclosure:  I, Mirna Palomino, am serving as a scribe at 1:41 PM on 2023 to document services personally performed by Camden Messer MD based on my observations and the provider's statements to me.     2023   Camden Messer MD Dunbar, John Forrest, MD  23 [id number]

## 2023-07-26 VITALS
OXYGEN SATURATION: 94 % | DIASTOLIC BLOOD PRESSURE: 103 MMHG | BODY MASS INDEX: 44.1 KG/M2 | WEIGHT: 315 LBS | TEMPERATURE: 98.4 F | RESPIRATION RATE: 18 BRPM | HEART RATE: 104 BPM | HEIGHT: 71 IN | SYSTOLIC BLOOD PRESSURE: 114 MMHG

## 2023-07-26 LAB
BASOPHILS # BLD AUTO: 0 10E3/UL (ref 0–0.2)
BASOPHILS NFR BLD AUTO: 0 %
EOSINOPHIL # BLD AUTO: 0.2 10E3/UL (ref 0–0.7)
EOSINOPHIL NFR BLD AUTO: 2 %
ERYTHROCYTE [DISTWIDTH] IN BLOOD BY AUTOMATED COUNT: 12.7 % (ref 10–15)
HCT VFR BLD AUTO: 43.5 % (ref 40–53)
HGB BLD-MCNC: 13.6 G/DL (ref 13.3–17.7)
IMM GRANULOCYTES # BLD: 0 10E3/UL
IMM GRANULOCYTES NFR BLD: 0 %
LYMPHOCYTES # BLD AUTO: 2.7 10E3/UL (ref 0.8–5.3)
LYMPHOCYTES NFR BLD AUTO: 25 %
MCH RBC QN AUTO: 28.2 PG (ref 26.5–33)
MCHC RBC AUTO-ENTMCNC: 31.3 G/DL (ref 31.5–36.5)
MCV RBC AUTO: 90 FL (ref 78–100)
MONOCYTES # BLD AUTO: 0.7 10E3/UL (ref 0–1.3)
MONOCYTES NFR BLD AUTO: 7 %
NEUTROPHILS # BLD AUTO: 7.4 10E3/UL (ref 1.6–8.3)
NEUTROPHILS NFR BLD AUTO: 66 %
NRBC # BLD AUTO: 0 10E3/UL
NRBC BLD AUTO-RTO: 0 /100
PLATELET # BLD AUTO: 351 10E3/UL (ref 150–450)
RBC # BLD AUTO: 4.83 10E6/UL (ref 4.4–5.9)
WBC # BLD AUTO: 11.2 10E3/UL (ref 4–11)

## 2023-07-26 PROCEDURE — 99284 EMERGENCY DEPT VISIT MOD MDM: CPT

## 2023-07-26 PROCEDURE — 85025 COMPLETE CBC W/AUTO DIFF WBC: CPT | Performed by: EMERGENCY MEDICINE

## 2023-07-26 PROCEDURE — 36415 COLL VENOUS BLD VENIPUNCTURE: CPT | Performed by: EMERGENCY MEDICINE

## 2023-07-26 PROCEDURE — 93005 ELECTROCARDIOGRAM TRACING: CPT

## 2023-07-26 PROCEDURE — 80048 BASIC METABOLIC PNL TOTAL CA: CPT | Performed by: EMERGENCY MEDICINE

## 2023-07-26 PROCEDURE — 87637 SARSCOV2&INF A&B&RSV AMP PRB: CPT | Performed by: EMERGENCY MEDICINE

## 2023-07-27 ENCOUNTER — HOSPITAL ENCOUNTER (EMERGENCY)
Facility: CLINIC | Age: 28
Discharge: HOME | End: 2023-07-27
Attending: EMERGENCY MEDICINE | Admitting: EMERGENCY MEDICINE

## 2023-07-27 DIAGNOSIS — R42 DIZZINESS: ICD-10-CM

## 2023-07-27 DIAGNOSIS — R51.9 FACIAL PAIN: ICD-10-CM

## 2023-07-27 LAB
ANION GAP SERPL CALCULATED.3IONS-SCNC: 10 MMOL/L (ref 7–15)
ATRIAL RATE - MUSE: 95 BPM
BUN SERPL-MCNC: 11.2 MG/DL (ref 6–20)
CALCIUM SERPL-MCNC: 9.4 MG/DL (ref 8.6–10)
CHLORIDE SERPL-SCNC: 98 MMOL/L (ref 98–107)
CREAT SERPL-MCNC: 0.85 MG/DL (ref 0.67–1.17)
DEPRECATED HCO3 PLAS-SCNC: 28 MMOL/L (ref 22–29)
DIASTOLIC BLOOD PRESSURE - MUSE: NORMAL MMHG
FLUAV RNA SPEC QL NAA+PROBE: NEGATIVE
FLUBV RNA RESP QL NAA+PROBE: NEGATIVE
GFR SERPL CREATININE-BSD FRML MDRD: >90 ML/MIN/1.73M2
GLUCOSE SERPL-MCNC: 157 MG/DL (ref 70–99)
INTERPRETATION ECG - MUSE: NORMAL
P AXIS - MUSE: 48 DEGREES
POTASSIUM SERPL-SCNC: 4.2 MMOL/L (ref 3.4–5.3)
PR INTERVAL - MUSE: 160 MS
QRS DURATION - MUSE: 94 MS
QT - MUSE: 340 MS
QTC - MUSE: 427 MS
R AXIS - MUSE: 43 DEGREES
RSV RNA SPEC NAA+PROBE: NEGATIVE
SARS-COV-2 RNA RESP QL NAA+PROBE: NEGATIVE
SODIUM SERPL-SCNC: 136 MMOL/L (ref 136–145)
SYSTOLIC BLOOD PRESSURE - MUSE: NORMAL MMHG
T AXIS - MUSE: 46 DEGREES
VENTRICULAR RATE- MUSE: 95 BPM

## 2023-07-27 RX ORDER — MECLIZINE HYDROCHLORIDE 25 MG/1
25 TABLET ORAL EVERY 6 HOURS PRN
Qty: 20 TABLET | Refills: 0 | Status: SHIPPED | OUTPATIENT
Start: 2023-07-27

## 2023-07-27 ASSESSMENT — ACTIVITIES OF DAILY LIVING (ADL): ADLS_ACUITY_SCORE: 33

## 2023-07-27 NOTE — ED PROVIDER NOTES
"  History     Chief Complaint:  Dizziness     The history is provided by the patient.      Kar Bower Jr. is a 28 year old male with history of anxiety who presents to the ED for evaluation of dizziness . He reported that for the past 2 months that he has been having dizziness and lightheadedness. He describes the dizziness to be \"floating\" and woke up yesterday feeling like he wanted to pass out. Last night, he reported feeling a sharp pain behind his left ear along with pressure on his left nose during episodes. He went to see an chiropractor earlier today with no result but was recommended seeing an MD. He states feeling tired lately.  He does have nausea with the episodes but denies fever, chest pain, and shortness of breath. Of note, he reported having an appointment in ENT next month.     Independent Historian:   None - Patient Only    Review of External Notes:   Care everywhere reviewed in epic updated    Medications:    albuterol (PROAIR HFA/PROVENTIL HFA/VENTOLIN HFA) 108 (90 Base) MCG/ACT inhaler  famotidine (PEPCID) 20 MG tablet  sucralfate (CARAFATE) 1 GM tablet    Past Medical History:    Past Medical History:   Diagnosis Date    Anxiety      Physical Exam   Patient Vitals for the past 24 hrs:   BP Temp Temp src Pulse Resp SpO2 Height Weight   07/26/23 2325 114/103 98.4  F (36.9  C) Oral 104 18 94 % 1.803 m (5' 11\") (!) 152.4 kg (336 lb)      Physical Exam  Nursing note and vitals reviewed.  Constitutional: Cooperative.  Sitting up comfortably in bed  HENT:   Mouth/Throat: Mucous membranes are normal.   Tympanic membranes normal bilaterally  Eyes: Pupils are equal, round, and reactive to light.  Extraocular movements intact  Cardiovascular: Normal rate, regular rhythm and normal heart sounds.  No murmur.  Pulmonary/Chest: Effort normal and breath sounds normal. No respiratory distress. No wheezes.   Abdominal: Soft. Normal appearance.   Neurological: Alert.  Oriented x4.  Cranial nerves II through XII " intact.  Strength and sensation normal.  GCS 15.  Skin: Skin is warm and dry. No rash noted on left face or ear.   Psychiatric: Normal mood and affect.     Emergency Department Course   ECG  ECG results from 07/27/23   EKG 12 lead     Value    Systolic Blood Pressure     Diastolic Blood Pressure     Ventricular Rate 95    Atrial Rate 95    AL Interval 160    QRS Duration 94        QTc 427    P Axis 48    R AXIS 43    T Axis 46    Interpretation ECG      Sinus rhythm  Incomplete right bundle branch block     Laboratory:  Labs Ordered and Resulted from Time of ED Arrival to Time of ED Departure   BASIC METABOLIC PANEL - Abnormal       Result Value    Sodium 136      Potassium 4.2      Chloride 98      Carbon Dioxide (CO2) 28      Anion Gap 10      Urea Nitrogen 11.2      Creatinine 0.85      Calcium 9.4      Glucose 157 (*)     GFR Estimate >90     CBC WITH PLATELETS AND DIFFERENTIAL - Abnormal    WBC Count 11.2 (*)     RBC Count 4.83      Hemoglobin 13.6      Hematocrit 43.5      MCV 90      MCH 28.2      MCHC 31.3 (*)     RDW 12.7      Platelet Count 351      % Neutrophils 66      % Lymphocytes 25      % Monocytes 7      % Eosinophils 2      % Basophils 0      % Immature Granulocytes 0      NRBCs per 100 WBC 0      Absolute Neutrophils 7.4      Absolute Lymphocytes 2.7      Absolute Monocytes 0.7      Absolute Eosinophils 0.2      Absolute Basophils 0.0      Absolute Immature Granulocytes 0.0      Absolute NRBCs 0.0     INFLUENZA A/B, RSV, & SARS-COV2 PCR - Normal    Influenza A PCR Negative      Influenza B PCR Negative      RSV PCR Negative      SARS CoV2 PCR Negative         Interventions:  Medications - No data to display     Independent Interpretation (X-rays, CTs, rhythm strip):  None    Assessments/Consultations/Discussion of Management or Tests:    ED Course as of 07/27/23 0157   Thu Jul 27, 2023   0127 I obtained history and examined the patient as noted above.      Social Determinants of Health  affecting care:   None    Disposition:  The patient was discharged to home.     Impression & Plan      Medical Decision Makin-year-old gentleman presents with nonspecific dizziness for the past 1 to 2 months.  It is somewhat positional.  I suspect benign vertigo and inner ear disorder.  Much less likely to represent a central nervous system causes such as intracranial mass lesion, demyelinating disease, stroke, bleed etc.  Basic blood work and EKG are reassuring.  No evidence of otitis media.  His neurologic exam is reassuring.  At this time we will proceed with supportive care with a trial of Antivert as needed.  I did place a referral to the vestibular rehab clinic for further evaluation of his probable vertiginous symptoms.  He already has follow-up scheduled with ears nose [facility] throat clinic.    Diagnosis:    ICD-10-CM   1. Dizziness  R42      2. Facial pain  R51.9         Discharge Medications:  Discharge Medication List as of 2023  1:34 AM        START taking these medications    Details   meclizine (ANTIVERT) 25 MG tablet Take 1 tablet (25 mg) by mouth every 6 hours as needed for dizziness, Disp-20 tablet, R-0, Local Print              Scribe Disclosure:  I, Chelly Hopkins, am serving as a scribe at 1:56 AM on 2023 to document services personally performed by Camden Luna MD based on my observations and the provider's statements to me.   2023   Camden Luna MD Amdahl, John, MD  23 023

## 2023-07-27 NOTE — ED TRIAGE NOTES
Pt c/o dizziness for the past few weeks. Pt states he woke up today with sharp pain on the L side of his head. States the dizziness is getting progressively worse. Denies CP, SOB, fevers, V/D. Is c/o intermittent nausea.

## 2023-10-30 ENCOUNTER — HOSPITAL ENCOUNTER (EMERGENCY)
Facility: CLINIC | Age: 28
Discharge: HOME | End: 2023-10-30
Attending: STUDENT IN AN ORGANIZED HEALTH CARE EDUCATION/TRAINING PROGRAM | Admitting: STUDENT IN AN ORGANIZED HEALTH CARE EDUCATION/TRAINING PROGRAM

## 2023-10-30 VITALS
BODY MASS INDEX: 45.33 KG/M2 | TEMPERATURE: 98.2 F | HEART RATE: 100 BPM | SYSTOLIC BLOOD PRESSURE: 152 MMHG | OXYGEN SATURATION: 96 % | WEIGHT: 315 LBS | DIASTOLIC BLOOD PRESSURE: 103 MMHG | RESPIRATION RATE: 20 BRPM

## 2023-10-30 DIAGNOSIS — R42 DIZZINESS: ICD-10-CM

## 2023-10-30 LAB
ATRIAL RATE - MUSE: 92 BPM
DIASTOLIC BLOOD PRESSURE - MUSE: NORMAL MMHG
INTERPRETATION ECG - MUSE: NORMAL
P AXIS - MUSE: 45 DEGREES
PR INTERVAL - MUSE: 154 MS
QRS DURATION - MUSE: 94 MS
QT - MUSE: 354 MS
QTC - MUSE: 437 MS
R AXIS - MUSE: 45 DEGREES
SYSTOLIC BLOOD PRESSURE - MUSE: NORMAL MMHG
T AXIS - MUSE: 42 DEGREES
VENTRICULAR RATE- MUSE: 92 BPM

## 2023-10-30 PROCEDURE — 93005 ELECTROCARDIOGRAM TRACING: CPT

## 2023-10-30 PROCEDURE — 99283 EMERGENCY DEPT VISIT LOW MDM: CPT

## 2023-10-30 ASSESSMENT — ACTIVITIES OF DAILY LIVING (ADL): ADLS_ACUITY_SCORE: 35

## 2023-10-30 NOTE — ED TRIAGE NOTES
Dizzy for last three months, on and off, had bad episode last night, this time with pressure on left side of head and blurry vision in left eye only. Dizziness has improved, feels lightheaded. No focal deficits noted in triage.

## 2023-10-30 NOTE — ED PROVIDER NOTES
History     Chief Complaint:  Dizziness and Headache       HPI   Kar Bower Jr. is a 28 year old male presenting with dizziness.  Patient has history of anxiety and has had dizziness for 3 months on and off.  Dizziness has become more frequent and he notices it when he sits up and then lies down at night.  On the way to work today he also had a pressure on the left side of his head and the left side of his jaw, and a fullness sensation in the left ear which prompted the visit.  He endorses left eye to be slightly blurry, but he can read things normally.  No double vision.  Right now he states he just feels slightly lightheaded.  Sometimes his heart will race.  No current headache or neck pain.  No chest pain, shortness of breath, abdominal pain.  No vomiting.  Tolerating p.o. is normal.  Patient drinks 3 beers a night.  Denies other substance use.  Sometimes patient takes Benadryl for his symptoms.  No tinnitus.      Independent Historian:    none    Review of External Notes:  Reviewed multiple recent ED notes for dizziness.    Medications:    albuterol (PROAIR HFA/PROVENTIL HFA/VENTOLIN HFA) 108 (90 Base) MCG/ACT inhaler  famotidine (PEPCID) 20 MG tablet  meclizine (ANTIVERT) 25 MG tablet  sucralfate (CARAFATE) 1 GM tablet        Past Medical History:    Past Medical History:   Diagnosis Date    Anxiety        Past Surgical History:    No past surgical history on file.       Physical Exam   Patient Vitals for the past 24 hrs:   BP Temp Temp src Pulse Resp SpO2 Weight   10/30/23 1115 (!) 152/103 98.2  F (36.8  C) Temporal 100 20 96 % 147.4 kg (325 lb)        Physical Exam  GENERAL: Patient well-appearing  HEAD: Atraumatic.  Mild tenderness to left side of face without skin changes.  EYES: Anicteric.  Visual acuity 20/25 left eye, 20/30 right eye, EOMI. Eyeballs appear normal bilaterally with clear sclera.  Ears: Bilateral external acoustic meatus with hardened wax.  Visible portion of TM is clear without signs  of infection.  NOSE: No active bleeding  MOUTH: Moist mucosa. No focal dental deformity.  Mild soreness to maxilla with palpation of maxillary teeth.  No focal drainage or pus or erythema.  THROAT: Patent airway.   NECK: No rigidity   CV: RRR, no murmurs rubs or gallops  PULM: CTAB with good aeration; no retractions, rales, rhonchi, or wheezing  ABD: Soft, nontender, nondistended, no guarding, no peritoneal signs   DERM: No rash. Skin warm and dry  EXTREMITY: Moving all extremities without difficulty.   NEURO: A,Ox3. CN 2-12 fully intact. Strength 5/5 bilateral LE/UE. Sensation fully intact to light touch symmetrically bilateral LE/UE. Normal finger-to-nose and heel to shin. No ataxia.  Negative Romberg.  No nystagmus.  Positive [place]-Hallpike.    Emergency Department Course   ECG  ECG interpreted by me.  Time 1233  NSR at 92.  Incomplete right bundle branch block.  No ST elevation or depression. Normal intervals. Normal axis. No evidence of WPW, Brugada, HOCM, ARVD, ASD, or Wellen's.  ECG unchanged from prior.    Imaging:  No orders to display          Laboratory:  Labs Ordered and Resulted from Time of ED Arrival to Time of ED Departure - No data to display       Emergency Department Course & Assessments:             Interventions:  Medications - No data to display     Independent Interpretation (X-rays, CTs, rhythm strip):  None    Consultations/Discussion of Management or Tests:  None       Social Determinants of Health affecting care:  none     Disposition:  The patient was discharged to home.     Impression & Plan         Medical Decision Making:  Patient presented feeling dizziness and lightheaded.  Chronic conditions complicating -dizziness x3 months.  DDx considered CVA, cardiac etiologies, infection, metabolic abnormality, among others.  VS only notable for systolic blood pressure 152 which is grossly unchanged from prior.  PE unremarkable.   Symptoms are positional and appear orthostatic in nature.  Also  when I perform [place]-Hallpike that did reconstitute some dizziness, but did not visualize nystagmus.  Neurologic exam is unremarkable -there are no focal deficits and unremarkable cerebellar testing.  At rest patient does not have dizziness.  Labs considered, patient is tolerating p.o. and appears well, do not think we need them.  Imaging considered, however this appears to be a chronic issue.  Furthermore he has fullness in his left ear and soreness in his jaw to palpation, thus this does not appear central in nature. Furthermore, with a normal neurologic exam, and without active dizziness except while doing [place]-Hallpike, do not think we need advanced imaging.  Symptoms appear potentially TMJ in nature versus aural fullness from wax buildup in the ear versus peripheral in nature.  In regards to the reported blurry vision, his vision is actually better in the left eye and is 20/25, so do not appreciate an acute ophthalmic emergency.  Given p.o. fluids.  ECG unremarkable.  Reassessed patient and he is well-appearing and continues to have no deficit.  Patient states when he closes eyes that also causes him to get dizzy but he states that is been going on for 3 months.  I have evaluated the patient for acute medical emergencies and have clinically decided no further acute medical interventions are required. Reassessed multiple times and well appearing. Patient stable for discharge. All questions answered. Given strict return precautions. Patient content with plan. The differential diagnosis and treatment modalities were discussed thoroughly with the patient. Recommended PCP follow-up in 2-3 days.        Critical Care time:  was 0 minutes for this patient excluding procedures.    Diagnosis:    ICD-10-CM    1. Dizziness  R42            Discharge Medications:  New Prescriptions    No medications on file          Norberto Guzman MD  10/30/2023   Norberto Guzman MD Foss, Kevin, MD  10/30/23 2270

## 2023-10-30 NOTE — DISCHARGE INSTRUCTIONS
Discharge Instructions  Dizziness (Lightheaded)  Today you were seen for dizziness.  Dizziness can be caused by many things and it can be very difficult to determine the cause of dizziness.  At this time, your provider has found no signs that your dizziness is due to a serious or life-threatening condition. However, sometimes there is a serious problem that does not show up right away, and it is important for you to follow up with your regular provider as instructed.  Generally, every Emergency Department visit should have a follow-up clinic visit with either a primary or a specialty clinic/provider. Please follow-up as instructed by your emergency provider today.      Return to the Emergency Department if:    You pass out (fainting or falling out), especially during exercise.    You develop chest pain, chest pressure or difficulty breathing.  Your feel an irregular heartbeat.  You have excessive vaginal bleeding, or blood in your stool or vomit (throw up).  You have a high fever.  Your symptoms get worse or more frequent.    If when you begin to feel dizzy or lightheaded, it is important to sit down or lay down immediately to prevent injury from falling.  If you were given a prescription for medicine here today, be sure to read all of the information (including the package insert) that comes with your prescription.  This will include important information about the medicine, its side effects, and any warnings that you need to know about.  The pharmacist who fills the prescription can provide more information and answer questions you may have about the medicine.  If you have questions or concerns that the pharmacist cannot address, please call or return to the Emergency Department.   Remember that you can always come back to the Emergency Department if you are not able to see your regular provider in the amount of time listed above, if you get any new symptoms, or if there is anything that worries you.  Return to  the emergency department if symptoms are worsening, become concerning, or for any other concerns. Follow-up with your doctor in 2-3 and sooner if needed.

## 2023-11-26 ENCOUNTER — HOSPITAL ENCOUNTER (EMERGENCY)
Facility: CLINIC | Age: 28
Discharge: HOME | End: 2023-11-26
Attending: EMERGENCY MEDICINE | Admitting: EMERGENCY MEDICINE

## 2023-11-26 VITALS
HEIGHT: 71 IN | SYSTOLIC BLOOD PRESSURE: 154 MMHG | TEMPERATURE: 98.1 F | HEART RATE: 103 BPM | RESPIRATION RATE: 20 BRPM | DIASTOLIC BLOOD PRESSURE: 98 MMHG | OXYGEN SATURATION: 93 % | WEIGHT: 315 LBS | BODY MASS INDEX: 44.1 KG/M2

## 2023-11-26 DIAGNOSIS — R73.9 ELEVATED BLOOD SUGAR: ICD-10-CM

## 2023-11-26 DIAGNOSIS — H53.8 BLURRED VISION: ICD-10-CM

## 2023-11-26 DIAGNOSIS — H04.123 DRY EYES: ICD-10-CM

## 2023-11-26 LAB
ANION GAP SERPL CALCULATED.3IONS-SCNC: 13 MMOL/L (ref 7–15)
BUN SERPL-MCNC: 6.2 MG/DL (ref 6–20)
CALCIUM SERPL-MCNC: 9.2 MG/DL (ref 8.6–10)
CHLORIDE SERPL-SCNC: 97 MMOL/L (ref 98–107)
CREAT SERPL-MCNC: 0.64 MG/DL (ref 0.67–1.17)
DEPRECATED HCO3 PLAS-SCNC: 27 MMOL/L (ref 22–29)
EGFRCR SERPLBLD CKD-EPI 2021: >90 ML/MIN/1.73M2
GLUCOSE BLDC GLUCOMTR-MCNC: 363 MG/DL (ref 70–99)
GLUCOSE SERPL-MCNC: 356 MG/DL (ref 70–99)
POTASSIUM SERPL-SCNC: 4.1 MMOL/L (ref 3.4–5.3)
SODIUM SERPL-SCNC: 137 MMOL/L (ref 135–145)

## 2023-11-26 PROCEDURE — 36415 COLL VENOUS BLD VENIPUNCTURE: CPT | Performed by: EMERGENCY MEDICINE

## 2023-11-26 PROCEDURE — 82962 GLUCOSE BLOOD TEST: CPT

## 2023-11-26 PROCEDURE — 258N000003 HC RX IP 258 OP 636: Performed by: EMERGENCY MEDICINE

## 2023-11-26 PROCEDURE — 96360 HYDRATION IV INFUSION INIT: CPT

## 2023-11-26 PROCEDURE — 82310 ASSAY OF CALCIUM: CPT | Performed by: EMERGENCY MEDICINE

## 2023-11-26 PROCEDURE — 99283 EMERGENCY DEPT VISIT LOW MDM: CPT | Mod: 25

## 2023-11-26 RX ORDER — TETRACAINE HYDROCHLORIDE 5 MG/ML
2 SOLUTION OPHTHALMIC ONCE
Status: DISCONTINUED | OUTPATIENT
Start: 2023-11-26 | End: 2023-11-26 | Stop reason: HOSPADM

## 2023-11-26 RX ADMIN — SODIUM CHLORIDE 1000 ML: 9 INJECTION, SOLUTION INTRAVENOUS at 16:50

## 2023-11-26 ASSESSMENT — VISUAL ACUITY
OD: 20/30
OS: 20/30

## 2023-11-26 ASSESSMENT — ACTIVITIES OF DAILY LIVING (ADL): ADLS_ACUITY_SCORE: 35

## 2023-11-26 NOTE — DISCHARGE INSTRUCTIONS
Please return to the emergency department if your symptoms increase, your eyes become red, swollen.    Please use lubricating gel for your eyes or lubricating drops.  You can use Systane night, lubricating eye gel at nighttime.  And then you can use refresh lubricating drops during the day.  This is over-the-counter.      Please schedule an appointment with an ophthalmologist.  I have provided you with [facility] number, please call their office to schedule an appointment.    I have placed a primary care consult for you.  They will call you to schedule an appointment.

## 2023-11-26 NOTE — ED PROVIDER NOTES
"  History     Chief Complaint:  Eye Problem       HPI   Kar Bower Jr. is a 28 year old male who presents emergency department with a complaint of 3 days of blurred vision and a feeling of sand in his eyes.  He has tried refresh eyedrops which do help temporarily.  Denies any redness of the eyes.  He does report that he has had blurry vision for quite some time, but has noticed that it is getting worse.  Patient reports that he does have a relatively new job where he is staring at his computer for a long amount of time.      Independent Historian:   None - Patient Only    Review of External Notes:   Reviewed patient's previous emergency department visit on 10/30/2023.  Patient was seen for dizziness and he was also having blurry vision at that time.          Medications:    metFORMIN (GLUCOPHAGE) 500 MG tablet  albuterol (PROAIR HFA/PROVENTIL HFA/VENTOLIN HFA) 108 (90 Base) MCG/ACT inhaler  famotidine (PEPCID) 20 MG tablet  meclizine (ANTIVERT) 25 MG tablet  sucralfate (CARAFATE) 1 GM tablet        Past Medical History:    Past Medical History:   Diagnosis Date    Anxiety        Past Surgical History:    No past surgical history on file.     Physical Exam   Patient Vitals for the past 24 hrs:   BP Temp Temp src Pulse Resp SpO2 Height Weight   11/26/23 1450 (!) 149/102 98.1  F (36.7  C) Oral 114 20 95 % 1.803 m (5' 11\") 147.4 kg (325 lb)        Physical Exam  GEN: No acute distress. Resting comfortably in the room.  Ears: Ear canals patent. No mastoid tenderness. No bulging or erythema of tympanic membranes.  Mouth: Uvula is midline. No tonsillar swelling or exudates. Mucous membranes are moist.  Neck: No lymphadenopathy.  Eyes General: Vision grossly intact. No visual field deficit. Extraocular movements intact. Pupil are equal and reactive. No periorbital swelling or ecchymosis. No foreign bodies visualized. No abnormalities on inversion of the eyelid.  Right eye: No discharge. No conjunctival injection. No " corneal abrasion or fluorescein uptake.  Leg eye: No discharge. No conjunctival injection. No corneal abrasion or fluorescein uptake.   Intraocular Pressure: R 17, L 16  Slit Lamp exam: No corneal flare, ulcer, foreign body, hyphema, or hypopyon.  Visual acuity: R 20/30. L 20/30      Emergency Department Course       Imaging:  No orders to display          Laboratory:  Labs Ordered and Resulted from Time of ED Arrival to Time of ED Departure   GLUCOSE BY METER - Abnormal       Result Value    GLUCOSE BY METER POCT 363 (*)    BASIC METABOLIC PANEL - Abnormal    Sodium 137      Potassium 4.1      Chloride 97 (*)     Carbon Dioxide (CO2) 27      Anion Gap 13      Urea Nitrogen 6.2      Creatinine 0.64 (*)     GFR Estimate >90      Calcium 9.2      Glucose 356 (*)         Procedures       Emergency Department Course & Assessments:             Interventions:  Medications   tetracaine (PONTOCAINE) 0.5 % ophthalmic solution 2 drop (has no administration in time range)   fluorescein (FUL-WENDY) ophthalmic strip 1 strip (has no administration in time range)   sodium chloride 0.9% BOLUS 1,000 mL (1,000 mLs Intravenous $New Bag 23 5120)        Assessments:      Independent Interpretation (X-rays, CTs, rhythm strip):  None    Consultations/Discussion of Management or Tests:  None        Social Determinants of Health affecting care:   None    Disposition:  The patient was discharged to home.     Impression & Plan    CMS Diagnoses: None      Medical Decision Makin-year-old male presents to the emergency department with a complaint of blurry vision.  This has been going on chronically, but over the past 3 days has increased.  He feels like there is sand in his eyes.  On exam, his pressures are within acceptable limits.  No signs of foreign bodies or abrasions.  No ulcers.  No [place] sign.  No periorbital swelling.  No pain with movement of the eyeball.  I do not think that he has an infection of his eyes.  His visual  acuity is 20/30 in both eyes.  I think that he does need to see an ophthalmologist.  He may need glasses or contacts.  I also do think that his eyes are dry from staring at his computer all day.  Patient has noticed increased thirst and urination. His fingerstick blood sugar is 365.  I think this can also be contributing to his blurry vision.  And he probably has dry eyes because he is dehydrated from his elevated blood sugar.  I will start him on metformin today.  No signs of DKA.  Kidney function is within acceptable limits.  The patient currently is not on any medication for diabetes, and has not been diagnosed with diabetes in the past.  He does not have a primary care physician so I will order a consult for him.  Patient is discharged home.      Diagnosis:    ICD-10-CM    1. Dry eyes  H04.123       2. Blurred vision  H53.8 Primary Care Referral      3. Elevated blood sugar  R73.9 Primary Care Referral           Discharge Medications:  New Prescriptions    METFORMIN (GLUCOPHAGE) 500 MG TABLET    Take 1 tablet (500 mg) by mouth 2 times daily (with meals) for 30 days          Alejandra Jordan MD  11/26/2023   Alejandra Jordan MD Richardson, Elizabeth, MD  11/30/23 0788

## 2023-11-26 NOTE — ED TRIAGE NOTES
"Pt presents for evaluation of bilateral blurred vision for the last 3-4 days. Is constant, described like \"looking under water\". In the morning upon waking, eyes are mattered. Throughout the day, pt feels like there \"is sand in his eyes\". Pt has tried refresh eye drops, which helps temporarily. Pt has also had vertigo like symptoms in the last few months, which he has been seen for.         " [Time Spent: ___ minutes] : I have spent [unfilled] minutes of time on the encounter.

## 2024-03-05 NOTE — ED NOTES
Fax received stating PA is needed for [facility]. Placed in mailbox.    Pt denies SI VSS except pulse elevated 110. Scores 0 on CIWA, Pt requests to go. LMHP evaluated  and felt he was ok to go.Will call [facility]. This writer talked to patient about his elevated liver enzymes and that we could set him up for treatment. HE states he is not interested in treatment and wants to leave.

## 2024-06-01 ENCOUNTER — HEALTH MAINTENANCE LETTER (OUTPATIENT)
Age: 29
End: 2024-06-01

## 2024-11-21 ENCOUNTER — HOSPITAL ENCOUNTER (EMERGENCY)
Facility: CLINIC | Age: 29
Discharge: HOME | End: 2024-11-21
Attending: EMERGENCY MEDICINE
Payer: MEDICAID

## 2024-11-21 ENCOUNTER — APPOINTMENT (OUTPATIENT)
Dept: GENERAL RADIOLOGY | Facility: CLINIC | Age: 29
End: 2024-11-21
Attending: EMERGENCY MEDICINE
Payer: MEDICAID

## 2024-11-21 VITALS
RESPIRATION RATE: 16 BRPM | DIASTOLIC BLOOD PRESSURE: 102 MMHG | OXYGEN SATURATION: 98 % | TEMPERATURE: 97 F | HEART RATE: 82 BPM | SYSTOLIC BLOOD PRESSURE: 151 MMHG

## 2024-11-21 DIAGNOSIS — R07.89 LEFT-SIDED CHEST WALL PAIN: ICD-10-CM

## 2024-11-21 LAB
ANION GAP SERPL CALCULATED.3IONS-SCNC: 14 MMOL/L (ref 7–15)
ATRIAL RATE - MUSE: 64 BPM
BASOPHILS # BLD AUTO: 0 10E3/UL (ref 0–0.2)
BASOPHILS NFR BLD AUTO: 0 %
BUN SERPL-MCNC: 10.5 MG/DL (ref 6–20)
CALCIUM SERPL-MCNC: 8.9 MG/DL (ref 8.8–10.4)
CHLORIDE SERPL-SCNC: 101 MMOL/L (ref 98–107)
CREAT SERPL-MCNC: 0.77 MG/DL (ref 0.67–1.17)
D DIMER PPP FEU-MCNC: <0.27 UG/ML FEU (ref 0–0.5)
DIASTOLIC BLOOD PRESSURE - MUSE: NORMAL MMHG
EGFRCR SERPLBLD CKD-EPI 2021: >90 ML/MIN/1.73M2
EOSINOPHIL # BLD AUTO: 0.1 10E3/UL (ref 0–0.7)
EOSINOPHIL NFR BLD AUTO: 1 %
ERYTHROCYTE [DISTWIDTH] IN BLOOD BY AUTOMATED COUNT: 12.5 % (ref 10–15)
GLUCOSE SERPL-MCNC: 111 MG/DL (ref 70–99)
HCO3 SERPL-SCNC: 24 MMOL/L (ref 22–29)
HCT VFR BLD AUTO: 44.6 % (ref 40–53)
HGB BLD-MCNC: 14.2 G/DL (ref 13.3–17.7)
HOLD SPECIMEN: NORMAL
HOLD SPECIMEN: NORMAL
IMM GRANULOCYTES # BLD: 0 10E3/UL
IMM GRANULOCYTES NFR BLD: 0 %
INTERPRETATION ECG - MUSE: NORMAL
LYMPHOCYTES # BLD AUTO: 2 10E3/UL (ref 0.8–5.3)
LYMPHOCYTES NFR BLD AUTO: 24 %
MCH RBC QN AUTO: 28 PG (ref 26.5–33)
MCHC RBC AUTO-ENTMCNC: 31.8 G/DL (ref 31.5–36.5)
MCV RBC AUTO: 88 FL (ref 78–100)
MONOCYTES # BLD AUTO: 0.7 10E3/UL (ref 0–1.3)
MONOCYTES NFR BLD AUTO: 8 %
NEUTROPHILS # BLD AUTO: 5.6 10E3/UL (ref 1.6–8.3)
NEUTROPHILS NFR BLD AUTO: 67 %
NRBC # BLD AUTO: 0 10E3/UL
NRBC BLD AUTO-RTO: 0 /100
P AXIS - MUSE: NORMAL DEGREES
PLATELET # BLD AUTO: 347 10E3/UL (ref 150–450)
POTASSIUM SERPL-SCNC: 3.9 MMOL/L (ref 3.4–5.3)
PR INTERVAL - MUSE: 126 MS
QRS DURATION - MUSE: 88 MS
QT - MUSE: 364 MS
QTC - MUSE: 375 MS
R AXIS - MUSE: 30 DEGREES
RBC # BLD AUTO: 5.08 10E6/UL (ref 4.4–5.9)
SODIUM SERPL-SCNC: 139 MMOL/L (ref 135–145)
SYSTOLIC BLOOD PRESSURE - MUSE: NORMAL MMHG
T AXIS - MUSE: 32 DEGREES
TROPONIN T SERPL HS-MCNC: <6 NG/L
VENTRICULAR RATE- MUSE: 64 BPM
WBC # BLD AUTO: 8.4 10E3/UL (ref 4–11)

## 2024-11-21 PROCEDURE — 85049 AUTOMATED PLATELET COUNT: CPT | Performed by: EMERGENCY MEDICINE

## 2024-11-21 PROCEDURE — 84295 ASSAY OF SERUM SODIUM: CPT | Performed by: EMERGENCY MEDICINE

## 2024-11-21 PROCEDURE — 84484 ASSAY OF TROPONIN QUANT: CPT | Performed by: EMERGENCY MEDICINE

## 2024-11-21 PROCEDURE — 82435 ASSAY OF BLOOD CHLORIDE: CPT | Performed by: EMERGENCY MEDICINE

## 2024-11-21 PROCEDURE — 85379 FIBRIN DEGRADATION QUANT: CPT | Performed by: EMERGENCY MEDICINE

## 2024-11-21 PROCEDURE — 93005 ELECTROCARDIOGRAM TRACING: CPT | Performed by: EMERGENCY MEDICINE

## 2024-11-21 PROCEDURE — 99285 EMERGENCY DEPT VISIT HI MDM: CPT | Mod: 25 | Performed by: EMERGENCY MEDICINE

## 2024-11-21 PROCEDURE — 36415 COLL VENOUS BLD VENIPUNCTURE: CPT | Performed by: EMERGENCY MEDICINE

## 2024-11-21 PROCEDURE — 82310 ASSAY OF CALCIUM: CPT | Performed by: EMERGENCY MEDICINE

## 2024-11-21 PROCEDURE — 85004 AUTOMATED DIFF WBC COUNT: CPT | Performed by: EMERGENCY MEDICINE

## 2024-11-21 PROCEDURE — 80048 BASIC METABOLIC PNL TOTAL CA: CPT | Performed by: EMERGENCY MEDICINE

## 2024-11-21 PROCEDURE — 71046 X-RAY EXAM CHEST 2 VIEWS: CPT

## 2024-11-21 PROCEDURE — 80051 ELECTROLYTE PANEL: CPT | Performed by: EMERGENCY MEDICINE

## 2024-11-21 PROCEDURE — 82947 ASSAY GLUCOSE BLOOD QUANT: CPT | Performed by: EMERGENCY MEDICINE

## 2024-11-21 PROCEDURE — 82565 ASSAY OF CREATININE: CPT | Performed by: EMERGENCY MEDICINE

## 2024-11-21 PROCEDURE — 82374 ASSAY BLOOD CARBON DIOXIDE: CPT | Performed by: EMERGENCY MEDICINE

## 2024-11-21 ASSESSMENT — ACTIVITIES OF DAILY LIVING (ADL)
ADLS_ACUITY_SCORE: 0
ADLS_ACUITY_SCORE: 0

## 2024-11-21 ASSESSMENT — COLUMBIA-SUICIDE SEVERITY RATING SCALE - C-SSRS
2. HAVE YOU ACTUALLY HAD ANY THOUGHTS OF KILLING YOURSELF IN THE PAST MONTH?: NO
6. HAVE YOU EVER DONE ANYTHING, STARTED TO DO ANYTHING, OR PREPARED TO DO ANYTHING TO END YOUR LIFE?: NO
1. IN THE PAST MONTH, HAVE YOU WISHED YOU WERE DEAD OR WISHED YOU COULD GO TO SLEEP AND NOT WAKE UP?: NO

## 2024-11-21 NOTE — ED TRIAGE NOTES
Left sided chest pain and arm pain that started one week ago. Patient reports that 3-4 days ago the pain started radiating into neck. He states that his pain worsens with activity. Denies hx of heart or lung disease.

## 2024-11-21 NOTE — ED PROVIDER NOTES
Emergency Department Note      History of Present Illness     Chief Complaint   Chest Pain and Arm Pain      HPI   Kar Bower Jr. is a 29 year old male with history of anxiety who presents to the ED for evaluation of chest pain and arm pain. The patient reports that for about a week he had arm pain that start right by his chest and radiates down to his armpit and down the rest of his arm. Rubbing his arm usually alleviates the pain until last night. The arm pain is new and occasionally he been taking Advil.  He noticed that he wakes up in the middle of the night with shortness of breath and he gasps for air. He believes this is secondary to his sleep apnea that he believes he has but with not official diagnosis. In the morning and middle of the night he experiences shortness of breath accompanied with chest discomforts. Today he became more concerned after he developed right sided jaw pain. He explain that he has a family history of cardiac disease on his mother sided. His mother passed away from a stroke when she was getting treatment for leukemia. Adds his sister has lower extremity edema.  Denies recent travel, or cold like symptoms which his girlfriend does.      Independent Historian   None    Review of External Notes   ***    Past Medical History     Medical History and Problem List   Anxiety     Medications   Albuterol   MCG/ACT   Pepcid   Antivert   Glucophage   Carafate     Surgical History   The patient has no pertinent past surgical history.   Physical Exam     Patient Vitals for the past 24 hrs:   BP Temp Temp src Pulse Resp SpO2   11/21/24 1442 (!) 151/102 -- -- -- -- --   11/21/24 1439 -- 97  F (36.1  C) Temporal 82 16 98 %     Physical Exam  ***    Diagnostics     Lab Results   Labs Ordered and Resulted from Time of ED Arrival to Time of ED Departure - No data to display    Imaging   No orders to display       EKG   ECG taken at 1452, ECG read at 1453  Sinus rhythm with premature  "  Supraventricular complexes   Rate 64 bpm. NC interval 126 ms. QRS duration 88 ms. QT/QTc 364/375 ms. P-R-T axes * 30 32.    Independent Interpretation   {IndependentReview:080329::\"None\"}    ED Course      Medications Administered   Medications - No data to display    Procedures   Procedures     Discussion of Management   {Consults/Care Discussions:849804::\"None\"}    ED Course   ED Course as of 11/21/24 1549   Thu Nov 21, 2024   1532 I obtained history and examined the patient as noted above.          Additional Documentation  {EPPAAdditionalPhrase:619668::\"None\"}    Medical Decision Making / Diagnosis     CMS Diagnoses: {Sepsis/Septic Shock/Stemi/Stroke:277699::\"None\"}    MIPS       {EPPA MIPS:694716::\"None\"}    GEORGINA Bower Jr. is a 29 year old male ***    Disposition   {EPPAFV Dispo:970884}    Diagnosis   No diagnosis found.     Discharge Medications   New Prescriptions    No medications on file         Scribe Disclosure:  I, Marie Nava, am serving as a scribe at 3:49 PM on 11/21/2024 to document services personally performed by Ilan Collins MD based on my observations and the provider's statements to me.     " Documentation  None    Medical Decision Making / Diagnosis     CMS Diagnoses: None    MIPS       None    [facility]   Kar Bower Jr. presents with chest pain.  The work up in the Emergency Department is negative.  The differential diagnosis of chest pain is broad and includes life threatening etiologies such as Acute coronary syndrome, Myocardial infarction, Pulmonary Embolism, and Acute Aortic Dissection.  Other causes may include pneumonia, pneumothorax, pericarditis, pleurisy, and esophageal spasm.  No serious etiology for the chest pain was detected today during this visit.      Close follow up with primary care is indicated should the pain continue, as further work up may be performed; this was made clear to Kar, who understands.     Disposition   The patient was discharged.     Diagnosis     ICD-10-CM    1. Left-sided chest wall pain  R07.89            Discharge Medications   Discharge Medication List as of 11/21/2024  5:10 PM            Scribe Disclosure:  I, Marie Nava, am serving as a scribe at 3:49 PM on 11/21/2024 to document services personally performed by Ilan Collins MD based on my observations and the provider's statements to me.        Ilan Collins MD  11/25/24 1010

## 2024-11-22 LAB
ATRIAL RATE - MUSE: 64 BPM
DIASTOLIC BLOOD PRESSURE - MUSE: NORMAL MMHG
INTERPRETATION ECG - MUSE: NORMAL
P AXIS - MUSE: NORMAL DEGREES
PR INTERVAL - MUSE: 126 MS
QRS DURATION - MUSE: 88 MS
QT - MUSE: 364 MS
QTC - MUSE: 375 MS
R AXIS - MUSE: 30 DEGREES
SYSTOLIC BLOOD PRESSURE - MUSE: NORMAL MMHG
T AXIS - MUSE: 32 DEGREES
VENTRICULAR RATE- MUSE: 64 BPM

## 2025-05-20 ENCOUNTER — HOSPITAL ENCOUNTER (EMERGENCY)
Facility: CLINIC | Age: 30
Discharge: HOME | End: 2025-05-20
Attending: PHYSICIAN ASSISTANT | Admitting: PHYSICIAN ASSISTANT
Payer: MEDICAID

## 2025-05-20 VITALS
DIASTOLIC BLOOD PRESSURE: 99 MMHG | RESPIRATION RATE: 16 BRPM | TEMPERATURE: 97.5 F | SYSTOLIC BLOOD PRESSURE: 156 MMHG | OXYGEN SATURATION: 98 % | HEART RATE: 107 BPM

## 2025-05-20 DIAGNOSIS — R03.0 ELEVATED BLOOD PRESSURE READING: ICD-10-CM

## 2025-05-20 DIAGNOSIS — H61.23 BILATERAL IMPACTED CERUMEN: ICD-10-CM

## 2025-05-20 PROCEDURE — 99282 EMERGENCY DEPT VISIT SF MDM: CPT

## 2025-05-20 PROCEDURE — 69209 REMOVE IMPACTED EAR WAX UNI: CPT | Mod: 50

## 2025-05-20 ASSESSMENT — COLUMBIA-SUICIDE SEVERITY RATING SCALE - C-SSRS
1. IN THE PAST MONTH, HAVE YOU WISHED YOU WERE DEAD OR WISHED YOU COULD GO TO SLEEP AND NOT WAKE UP?: NO
6. HAVE YOU EVER DONE ANYTHING, STARTED TO DO ANYTHING, OR PREPARED TO DO ANYTHING TO END YOUR LIFE?: NO
2. HAVE YOU ACTUALLY HAD ANY THOUGHTS OF KILLING YOURSELF IN THE PAST MONTH?: NO

## 2025-05-20 ASSESSMENT — ACTIVITIES OF DAILY LIVING (ADL): ADLS_ACUITY_SCORE: 41

## 2025-05-20 NOTE — ED TRIAGE NOTES
Patient reports blockages in his bilateral ears for the past few days. Suspects that it is related to ear wax. Tried home remedies, but none have worked.

## 2025-05-20 NOTE — ED PROVIDER NOTES
Emergency Department Note      History of Present Illness     Chief Complaint   Cerumen Impaction      HPI   Kar Bower Jr. is a 30 year old male who presents to the ED for evaluation of cerumen impaction. The patient reports that about 3 days ago he had an onset of abnormal ear pressure while in the shower. Patient states that it felt similar to having water in the ear. Patient has been struggling to hear from his right ear and endorses pain last night. His left ear has started to develop pressure as well. He used a Debrox earwax removal kit which brought no relief. Patient denies fever or pharyngitis. He is concerned for bilateral cerumen impaction.    Independent Historian   None    Review of External Notes   none    Past Medical History     Medical History and Problem List   Anxiety    Medications   Albuterol   MCG/ACT   Pepcid   Antivert   Glucophage   Carafate     Surgical History   The patient has no pertinent past surgical history.     Physical Exam     Patient Vitals for the past 24 hrs:   BP Temp Temp src Pulse Resp SpO2   05/20/25 1732 (!) 177/100 -- -- -- -- --   05/20/25 1731 -- 97.5  F (36.4  C) Temporal 118 16 98 %     Physical Exam  Constitutional: Alert, attentive, GCS 15  HENT:     Nose: Nose normal.   Mouth/Throat: Oropharynx is clear, mucous membranes are moist   Ears: Bilateral cerumen impaction. After irrigation, cerumen impaction resolved. Normal tympanic membranes.  Normal external canals.  Eyes: EOM are normal. Pupils equal and reactive.  Neck: Normal range of motion. No rigidity.  CV: Regular rate and rhythm, no murmurs, rubs or gallups.  Chest: Effort normal and breath sounds normal.   MSK: Normal range of motion.   Neurological: Alert, attentive  Skin: Skin is warm and dry.     Diagnostics     Lab Results   Labs Ordered and Resulted from Time of ED Arrival to Time of ED Departure - No data to display    Imaging   No orders to display       EKG   None    Independent Interpretation    None    ED Course      Medications Administered   Medications - No data to display    Procedures   None     Discussion of Management   None    ED Course   ED Course as of 05/20/25 1837 Tue May 20, 2025   1741 I obtained history and examined the patient as noted above.    1828 I rechecked and updated the patient. Plan is for discharge       Additional Documentation  None    Medical Decision Making / Diagnosis     CMS Diagnoses: None    MIPS   None    MDM   Kar Bower Jr. is a 30 year old male who presents for evaluation of bilateral ear pressure and muffled  hearing.  Vitals show mild hypertension at 156/99 otherwise normal.  Physical exam reveals a bilateral cerumen impaction.  Ears were successfully irrigated by nursing staff with resolution of impaction.  There is no evidence of otitis media, otitis externa, mastoiditis.  Patient felt much improved after irrigation.  Recommend continue same Debrox drops at home and then follow-up with primary care provider as needed.  Return precautions reviewed including any fevers or ear pain.  Questions answered.  Patient comfortable with plan.    Disposition   The patient was discharged.     Diagnosis     ICD-10-CM    1. Bilateral impacted cerumen  H61.23       2. Elevated blood pressure reading  R03.0          Scribe Disclosure:  I, Kris Martinez, am serving as a scribe at 5:36 PM on 5/20/2025 to document services personally performed by Silvia Dean PA-C based on my observations and the provider's statements to me.        Silvia Dean PA-C  05/20/25 8591

## 2025-05-20 NOTE — DISCHARGE INSTRUCTIONS
Continue home Debrox drops as needed. Return to ED with any worsening symptoms such as ear pain or fevers.

## 2025-06-14 ENCOUNTER — HEALTH MAINTENANCE LETTER (OUTPATIENT)
Age: 30
End: 2025-06-14

## 2025-07-14 ENCOUNTER — HOSPITAL ENCOUNTER (EMERGENCY)
Facility: CLINIC | Age: 30
Discharge: HOME | End: 2025-07-14
Attending: EMERGENCY MEDICINE
Payer: MEDICAID

## 2025-07-14 VITALS
WEIGHT: 315 LBS | SYSTOLIC BLOOD PRESSURE: 136 MMHG | TEMPERATURE: 97.1 F | OXYGEN SATURATION: 100 % | BODY MASS INDEX: 44.1 KG/M2 | RESPIRATION RATE: 18 BRPM | HEIGHT: 71 IN | DIASTOLIC BLOOD PRESSURE: 95 MMHG | HEART RATE: 94 BPM

## 2025-07-14 DIAGNOSIS — R03.0 ELEVATED BLOOD PRESSURE READING WITHOUT DIAGNOSIS OF HYPERTENSION: ICD-10-CM

## 2025-07-14 DIAGNOSIS — E11.65 HYPERGLYCEMIA DUE TO DIABETES MELLITUS (H): ICD-10-CM

## 2025-07-14 DIAGNOSIS — L73.9 FOLLICULITIS: ICD-10-CM

## 2025-07-14 LAB
ANION GAP SERPL CALCULATED.3IONS-SCNC: 14 MMOL/L (ref 7–15)
B-OH-BUTYR SERPL-SCNC: 2.17 MMOL/L
BASE EXCESS BLDV CALC-SCNC: 0.8 MMOL/L (ref -3–3)
BASOPHILS # BLD AUTO: 0 10E3/UL (ref 0–0.2)
BASOPHILS NFR BLD AUTO: 0 %
BUN SERPL-MCNC: 9.8 MG/DL (ref 6–20)
CALCIUM SERPL-MCNC: 9.2 MG/DL (ref 8.8–10.4)
CHLORIDE SERPL-SCNC: 95 MMOL/L (ref 98–107)
CREAT SERPL-MCNC: 0.74 MG/DL (ref 0.67–1.17)
EGFRCR SERPLBLD CKD-EPI 2021: >90 ML/MIN/1.73M2
EOSINOPHIL # BLD AUTO: 0.1 10E3/UL (ref 0–0.7)
EOSINOPHIL NFR BLD AUTO: 2 %
ERYTHROCYTE [DISTWIDTH] IN BLOOD BY AUTOMATED COUNT: 13.2 % (ref 10–15)
GLUCOSE BLDC GLUCOMTR-MCNC: 295 MG/DL (ref 70–99)
GLUCOSE BLDC GLUCOMTR-MCNC: 324 MG/DL (ref 70–99)
GLUCOSE BLDC GLUCOMTR-MCNC: 417 MG/DL (ref 70–99)
GLUCOSE SERPL-MCNC: 402 MG/DL (ref 70–99)
HCO3 BLDV-SCNC: 27 MMOL/L (ref 21–28)
HCO3 SERPL-SCNC: 23 MMOL/L (ref 22–29)
HCT VFR BLD AUTO: 42.5 % (ref 40–53)
HGB BLD-MCNC: 14 G/DL (ref 13.3–17.7)
HOLD SPECIMEN: NORMAL
HOLD SPECIMEN: NORMAL
IMM GRANULOCYTES # BLD: 0 10E3/UL
IMM GRANULOCYTES NFR BLD: 0 %
LIPASE SERPL-CCNC: 30 U/L (ref 13–60)
LYMPHOCYTES # BLD AUTO: 1.9 10E3/UL (ref 0.8–5.3)
LYMPHOCYTES NFR BLD AUTO: 30 %
MCH RBC QN AUTO: 28.6 PG (ref 26.5–33)
MCHC RBC AUTO-ENTMCNC: 32.9 G/DL (ref 31.5–36.5)
MCV RBC AUTO: 87 FL (ref 78–100)
MONOCYTES # BLD AUTO: 0.6 10E3/UL (ref 0–1.3)
MONOCYTES NFR BLD AUTO: 9 %
NEUTROPHILS # BLD AUTO: 3.7 10E3/UL (ref 1.6–8.3)
NEUTROPHILS NFR BLD AUTO: 59 %
NRBC # BLD AUTO: 0 10E3/UL
NRBC BLD AUTO-RTO: 0 /100
O2/TOTAL GAS SETTING VFR VENT: 21 %
OXYHGB MFR BLDV: 41 % (ref 70–75)
PCO2 BLDV: 50 MM HG (ref 40–50)
PH BLDV: 7.35 [PH] (ref 7.32–7.43)
PLATELET # BLD AUTO: 274 10E3/UL (ref 150–450)
PO2 BLDV: 24 MM HG (ref 25–47)
POTASSIUM SERPL-SCNC: 3.9 MMOL/L (ref 3.4–5.3)
RBC # BLD AUTO: 4.9 10E6/UL (ref 4.4–5.9)
SAO2 % BLDV: 41.4 % (ref 70–75)
SODIUM SERPL-SCNC: 132 MMOL/L (ref 135–145)
TROPONIN T SERPL HS-MCNC: <6 NG/L
WBC # BLD AUTO: 6.4 10E3/UL (ref 4–11)

## 2025-07-14 PROCEDURE — 99284 EMERGENCY DEPT VISIT MOD MDM: CPT | Mod: 25 | Performed by: EMERGENCY MEDICINE

## 2025-07-14 PROCEDURE — 85025 COMPLETE CBC W/AUTO DIFF WBC: CPT | Performed by: EMERGENCY MEDICINE

## 2025-07-14 PROCEDURE — 36415 COLL VENOUS BLD VENIPUNCTURE: CPT | Performed by: EMERGENCY MEDICINE

## 2025-07-14 PROCEDURE — 99284 EMERGENCY DEPT VISIT MOD MDM: CPT | Performed by: EMERGENCY MEDICINE

## 2025-07-14 PROCEDURE — 93005 ELECTROCARDIOGRAM TRACING: CPT

## 2025-07-14 PROCEDURE — 84484 ASSAY OF TROPONIN QUANT: CPT | Performed by: EMERGENCY MEDICINE

## 2025-07-14 PROCEDURE — 96360 HYDRATION IV INFUSION INIT: CPT

## 2025-07-14 PROCEDURE — 82805 BLOOD GASES W/O2 SATURATION: CPT | Performed by: EMERGENCY MEDICINE

## 2025-07-14 PROCEDURE — 82962 GLUCOSE BLOOD TEST: CPT

## 2025-07-14 PROCEDURE — 258N000003 HC RX IP 258 OP 636: Performed by: EMERGENCY MEDICINE

## 2025-07-14 PROCEDURE — 80048 BASIC METABOLIC PNL TOTAL CA: CPT | Performed by: EMERGENCY MEDICINE

## 2025-07-14 PROCEDURE — 83690 ASSAY OF LIPASE: CPT | Performed by: EMERGENCY MEDICINE

## 2025-07-14 PROCEDURE — 82010 KETONE BODYS QUAN: CPT | Performed by: EMERGENCY MEDICINE

## 2025-07-14 RX ORDER — CLINDAMYCIN PHOSPHATE 10 UG/ML
LOTION TOPICAL 2 TIMES DAILY
Qty: 60 ML | Refills: 0 | Status: SHIPPED | OUTPATIENT
Start: 2025-07-14 | End: 2025-07-21

## 2025-07-14 RX ADMIN — SODIUM CHLORIDE 1000 ML: 0.9 INJECTION, SOLUTION INTRAVENOUS at 20:17

## 2025-07-14 ASSESSMENT — ACTIVITIES OF DAILY LIVING (ADL)
ADLS_ACUITY_SCORE: 41

## 2025-07-14 ASSESSMENT — COLUMBIA-SUICIDE SEVERITY RATING SCALE - C-SSRS
1. IN THE PAST MONTH, HAVE YOU WISHED YOU WERE DEAD OR WISHED YOU COULD GO TO SLEEP AND NOT WAKE UP?: NO
2. HAVE YOU ACTUALLY HAD ANY THOUGHTS OF KILLING YOURSELF IN THE PAST MONTH?: NO
6. HAVE YOU EVER DONE ANYTHING, STARTED TO DO ANYTHING, OR PREPARED TO DO ANYTHING TO END YOUR LIFE?: NO

## 2025-07-14 NOTE — ED TRIAGE NOTES
Patient reports elevated glucose levels for about 10 days.  Reports no changes to DM medications.  ABCs intact, A&Ox4     Triage Assessment (Adult)       Row Name 07/14/25 1912          Triage Assessment    Airway WDL WDL        Respiratory WDL    Respiratory WDL WDL        Skin Circulation/Temperature WDL    Skin Circulation/Temperature WDL WDL        Cardiac WDL    Cardiac WDL WDL        Peripheral/Neurovascular WDL    Peripheral Neurovascular WDL WDL        Cognitive/Neuro/Behavioral WDL    Cognitive/Neuro/Behavioral WDL WDL

## 2025-07-14 NOTE — ED PROVIDER NOTES
Emergency Department Note      History of Present Illness     Chief Complaint   Hyperglycemia    HPI   Kar Bower Jr. is a 30 year old male with a history of anxiety who presents to the emergency department for hyperglycemia. The patient states that for 1.5 weeks, he has observed elevated BG readings in the mid 200's and today, he observed that his BG was in the 400's. He notes that he was seen a few years ago in the ED and was diagnosed with type 2 diabetes.  He was started on metformin, which he reports taking for about 1.5 years, though has since been off for a few months.  He has not establish care with a primary care provider, nor met with an endocrinologist.  He does acknowledge associated symptoms of polydipsia, polyuria, fatigue, dry mouth, and occasional upper abdominal pain.  He has been trying to change his diet, noting that he is now only drinking diet sodas, though does consume a few alcoholic beverages throughout the week.  He denies any associated chest pain, chest pressure, shortness of breath, vomiting, diarrhea, fevers nor chills.  He does also note a rash that has developed to the lateral aspect of his left calf for the past week or so.  He is unsure if this is related to his diabetes.    Independent Historian   None    Review of External Notes   Reviewed ED visit from 05/20/25.  Patient was noted to have elevated blood pressure reading at that time    Past Medical History     Medical History and Problem List   Anxiety  Type 2 diabetes    Medications   albuterol (PROAIR HFA/PROVENTIL HFA/VENTOLIN HFA) 108 (90 Base) MCG/ACT inhaler  famotidine (PEPCID) 20 MG tablet  meclizine (ANTIVERT) 25 MG tablet  metFORMIN (GLUCOPHAGE) 500 MG tablet  sucralfate (CARAFATE) 1 GM tablet    Surgical History   No past surgical history on file.    Physical Exam     Patient Vitals for the past 24 hrs:   BP Temp Temp src Pulse Resp SpO2 Height Weight   07/14/25 2137 (!) 136/95 -- -- 94 18 100 % -- --   07/14/25  "1823 (!) 160/118 97.1  F (36.2  C) Temporal 102 18 100 % 1.803 m (5' 11\") (!) 157.5 kg (347 lb 3.6 oz)     Physical Exam  General:              Well-nourished              Speaking in full sentences              Resting comfortably on gurney              No kussmaul respirations  Eyes:              Conjunctiva without injection or scleral icterus  ENT:              Moist mucous membranes              Nares patent              Pinnae normal  Neck:              Full ROM              No stiffness appreciated  Resp:              Lungs CTAB              No crackles, wheezing or audible rubs              Good air movement  CV:                    Normal rate, regular rhythm              S1 and S2 present              No murmur, gallop or rub  GI:              BS present              Abdomen soft without distention              Non-tender to light and deep palpation              No guarding or rebound tenderness  Skin:              Warm, dry, well perfused              Scattered erythematous rash to lateral left calf centered around hair follicles  MSK:              Moves all extremities              No focal deformities or swelling  Neuro:              Alert              Answers questions appropriately              Moves all extremities equally              Gait stable  Psych:              Normal affect, normal mood    Diagnostics     Lab Results   Labs Ordered and Resulted from Time of ED Arrival to Time of ED Departure   GLUCOSE BY METER - Abnormal       Result Value    GLUCOSE BY METER POCT 417 (*)    BASIC METABOLIC PANEL - Abnormal    Sodium 132 (*)     Potassium 3.9      Chloride 95 (*)     Carbon Dioxide (CO2) 23      Anion Gap 14      Urea Nitrogen 9.8      Creatinine 0.74      GFR Estimate >90      Calcium 9.2      Glucose 402 (*)    BLOOD GAS VENOUS - Abnormal    pH Venous 7.35      pCO2 Venous 50      pO2 Venous 24 (*)     Bicarbonate Venous 27      Base Excess/Deficit Venous 0.8      FIO2 21      Oxyhemoglobin " Venous 41 (*)     O2 Sat, Venous 41.4 (*)    KETONE BETA-HYDROXYBUTYRATE QUANTITATIVE, RAPID - Abnormal    Ketone (Beta-Hydroxybutyrate) Quantitative 2.17 (*)    GLUCOSE BY METER - Abnormal    GLUCOSE BY METER POCT 324 (*)    GLUCOSE BY METER - Abnormal    GLUCOSE BY METER POCT 295 (*)    TROPONIN T, HIGH SENSITIVITY - Normal    Troponin T, High Sensitivity <6     LIPASE - Normal    Lipase 30     CBC WITH PLATELETS AND DIFFERENTIAL    WBC Count 6.4      RBC Count 4.90      Hemoglobin 14.0      Hematocrit 42.5      MCV 87      MCH 28.6      MCHC 32.9      RDW 13.2      Platelet Count 274      % Neutrophils 59      % Lymphocytes 30      % Monocytes 9      % Eosinophils 2      % Basophils 0      % Immature Granulocytes 0      NRBCs per 100 WBC 0      Absolute Neutrophils 3.7      Absolute Lymphocytes 1.9      Absolute Monocytes 0.6      Absolute Eosinophils 0.1      Absolute Basophils 0.0      Absolute Immature Granulocytes 0.0      Absolute NRBCs 0.0       Imaging   None    EKG   ECG results from 07/14/25   EKG 12 lead     Value    Systolic Blood Pressure     Diastolic Blood Pressure     Ventricular Rate 95    Atrial Rate 95    LA Interval 160    QRS Duration 94        QTc 427    P Axis 67    R AXIS 15    T Axis 40    Interpretation ECG      Sinus rhythm  Incomplete right bundle branch block  Borderline ECG  When compared with ECG of 21-Nov-2024 14:52,  Premature supraventricular complexes are no longer Present  Vent. rate has increased by  31 bpm  QT has lengthened       Independent Interpretation   None    ED Course      Medications Administered   Medications   sodium chloride 0.9% BOLUS 1,000 mL (0 mLs Intravenous Stopped 7/14/25 2140)     Discussion of Management   None    ED Course   ED Course as of 07/15/25 0237   Mon Jul 14, 2025   1843 I obtained history and examined the patient as noted above.      2049 I discussed findings and discharge with the patient. All questions answered.        Additional  Documentation  None    Medical Decision Making / Diagnosis     CMS Diagnoses: None    MIPS   None    [facility]   Kar Bower Jr. is a 30 year old male presenting to the ER with concerns for hyperglycemia.  VS on presentation reveal elevated BP which improved on recheck.  History, exam, and ED course as outlined above.  Patient at this time shows evidence of poorly controlled diabetes.  Blood sugar on initial evaluation returned elevated at 402, which did progressively improve to 324 and subsequently 295.  Patient overall clinically appears quite well, resting comfortably in the chair.  I considered DKA, though at this point feel this to be unlikely in the absence of elevated anion gap, acidosis, and unremarkable VBG.  Metabolic panel reveals sodium of 132 (likely pseudohyponatremia in the context of hyperglycemia) as well as chloride of 95.  Suspect etiology for elevated blood sugar likely related to underlying poorly controlled diabetes and medication noncompliance as he has been off metformin for a number of months.  He denies chest pain or shortness of breath that would raise concern for anginal equivalent.  EKG demonstrates sinus rhythm, incomplete right bundle branch block morphology which has been noted previously, though undetectable troponin.  No vomiting, diarrhea, nor abdominal pain on exam to suggest concurrent surgical intra-abdominal pathology.  He does have a skin rash to his left leg consistent with folliculitis, likely unrelated to his presenting symptoms.  Results and clinical impression were discussed with patient and significant other at bedside.  I stressed the importance of the need to establish care with a primary care provider for ongoing management of his diabetes to prevent long-term sequela.  He verbalized understanding of this.  I did place an order through the EMR to assist with arranging expedited emergent outpatient follow-up.  As he has tolerated metformin in the past, we will reinitiate  this from the ER.  In discussion with pharmacy, will begin 500 mg daily x 7 days, and if tolerating okay, increase to 500 mg twice daily.  We discussed that further prescription medications will need to be coming from a primary care provider.  He will also be given a prescription for clindamycin lotion for treatment of folliculitis.  Patient will continue to monitor his blood sugar at home and does have a glucometer available to him.  He will return to the ER should any new or troubling symptoms arise.  He is understanding of the need for very close outpatient follow-up, though is welcome to return should any other concerns arise.    Disposition   The patient was discharged.     Diagnosis     ICD-10-CM    1. Hyperglycemia due to diabetes mellitus (H)  E11.65 ED To Primary Care Referral      2. Elevated blood pressure reading without diagnosis of hypertension  R03.0       3. Folliculitis  L73.9          Discharge Medications   Discharge Medication List as of 7/14/2025  9:42 PM        START taking these medications    Details   clindamycin (CLEOCIN T) 1 % external lotion Apply topically 2 times daily for 7 days.Disp-60 mL, [id number]         Metformin    Scribe Disclosure:  I, Larry Casiano, am serving as a scribe at 6:41 PM on 7/14/2025 to document services personally performed by Ilan Alfonso MD based on my observations and the provider's statements to me.        Ilan Alfonso MD  07/15/25 9796

## 2025-07-15 LAB
ATRIAL RATE - MUSE: 95 BPM
DIASTOLIC BLOOD PRESSURE - MUSE: NORMAL MMHG
INTERPRETATION ECG - MUSE: NORMAL
P AXIS - MUSE: 67 DEGREES
PR INTERVAL - MUSE: 160 MS
QRS DURATION - MUSE: 94 MS
QT - MUSE: 340 MS
QTC - MUSE: 427 MS
R AXIS - MUSE: 15 DEGREES
SYSTOLIC BLOOD PRESSURE - MUSE: NORMAL MMHG
T AXIS - MUSE: 40 DEGREES
VENTRICULAR RATE- MUSE: 95 BPM

## 2025-07-15 NOTE — ED NOTES
pt is wondering if he continues to take his metformin his ketones will continue to increase, is that right? He is wondering if he should stop taking his metformin? MD stated spoke with pharmacist and should be fine, notified pt

## 2025-07-15 NOTE — DISCHARGE INSTRUCTIONS
Please monitor symptoms very closely.  Begin metformin once per day for 1 week, then twice a day which is the same dose he had been started on previously.  Following up with a primary care provider is extremely important for ongoing management of your diabetes and to ensure good control moving forward.    I have placed an order to help arrange outpatient follow-up so you should receive a call from their clinic coordinator about arranging follow-up.    Please begin the topical antibiotic lotion to the outside of your leg to help with local skin infection.    Continue to monitor your blood sugars closely at home.  Return to the ER if you develop worsening symptoms such as persistently elevated blood sugars, vomiting, fevers, chills, chest pain, shortness of breath or any other concerns reported happy to reevaluate you.

## 2025-07-21 ENCOUNTER — OFFICE VISIT (OUTPATIENT)
Dept: FAMILY MEDICINE | Facility: CLINIC | Age: 30
End: 2025-07-21
Attending: EMERGENCY MEDICINE
Payer: MEDICAID

## 2025-07-21 VITALS
TEMPERATURE: 98.1 F | OXYGEN SATURATION: 97 % | WEIGHT: 315 LBS | SYSTOLIC BLOOD PRESSURE: 135 MMHG | HEART RATE: 84 BPM | RESPIRATION RATE: 20 BRPM | DIASTOLIC BLOOD PRESSURE: 85 MMHG | HEIGHT: 71 IN | BODY MASS INDEX: 44.1 KG/M2

## 2025-07-21 DIAGNOSIS — E11.65 HYPERGLYCEMIA DUE TO DIABETES MELLITUS (H): ICD-10-CM

## 2025-07-21 DIAGNOSIS — E11.65 TYPE 2 DIABETES MELLITUS WITH HYPERGLYCEMIA, WITHOUT LONG-TERM CURRENT USE OF INSULIN (H): Primary | ICD-10-CM

## 2025-07-21 DIAGNOSIS — Z13.6 SCREENING FOR CARDIOVASCULAR CONDITION: ICD-10-CM

## 2025-07-21 LAB
CHOLEST SERPL-MCNC: 175 MG/DL
EST. AVERAGE GLUCOSE BLD GHB EST-MCNC: 280 MG/DL
HBA1C MFR BLD: 11.4 % (ref 0–5.6)
HDLC SERPL-MCNC: 34 MG/DL
HOLD SPECIMEN: NORMAL
LDLC SERPL CALC-MCNC: 102 MG/DL
NONHDLC SERPL-MCNC: 141 MG/DL
TRIGL SERPL-MCNC: 197 MG/DL

## 2025-07-21 PROCEDURE — 80061 LIPID PANEL: CPT

## 2025-07-21 PROCEDURE — 83036 HEMOGLOBIN GLYCOSYLATED A1C: CPT

## 2025-07-21 PROCEDURE — 99204 OFFICE O/P NEW MOD 45 MIN: CPT

## 2025-07-21 PROCEDURE — 3046F HEMOGLOBIN A1C LEVEL >9.0%: CPT

## 2025-07-21 PROCEDURE — 3079F DIAST BP 80-89 MM HG: CPT

## 2025-07-21 PROCEDURE — 36415 COLL VENOUS BLD VENIPUNCTURE: CPT

## 2025-07-21 PROCEDURE — 3075F SYST BP GE 130 - 139MM HG: CPT

## 2025-07-21 PROCEDURE — G2211 COMPLEX E/M VISIT ADD ON: HCPCS

## 2025-07-21 ASSESSMENT — ANXIETY QUESTIONNAIRES
7. FEELING AFRAID AS IF SOMETHING AWFUL MIGHT HAPPEN: NOT AT ALL
5. BEING SO RESTLESS THAT IT IS HARD TO SIT STILL: NOT AT ALL
GAD7 TOTAL SCORE: 0
GAD7 TOTAL SCORE: 0
IF YOU CHECKED OFF ANY PROBLEMS ON THIS QUESTIONNAIRE, HOW DIFFICULT HAVE THESE PROBLEMS MADE IT FOR YOU TO DO YOUR WORK, TAKE CARE OF THINGS AT HOME, OR GET ALONG WITH OTHER PEOPLE: NOT DIFFICULT AT ALL
1. FEELING NERVOUS, ANXIOUS, OR ON EDGE: NOT AT ALL
3. WORRYING TOO MUCH ABOUT DIFFERENT THINGS: NOT AT ALL
6. BECOMING EASILY ANNOYED OR IRRITABLE: NOT AT ALL
2. NOT BEING ABLE TO STOP OR CONTROL WORRYING: NOT AT ALL

## 2025-07-21 ASSESSMENT — PATIENT HEALTH QUESTIONNAIRE - PHQ9
SUM OF ALL RESPONSES TO PHQ QUESTIONS 1-9: 0
5. POOR APPETITE OR OVEREATING: NOT AT ALL
10. IF YOU CHECKED OFF ANY PROBLEMS, HOW DIFFICULT HAVE THESE PROBLEMS MADE IT FOR YOU TO DO YOUR WORK, TAKE CARE OF THINGS AT HOME, OR GET ALONG WITH OTHER PEOPLE: NOT DIFFICULT AT ALL
SUM OF ALL RESPONSES TO PHQ QUESTIONS 1-9: 0

## 2025-07-21 NOTE — PROGRESS NOTES
"  Assessment & Plan     (E11.65) Type 2 diabetes mellitus with hyperglycemia, without long-term current use of insulin (H)  (primary encounter diagnosis)  (E11.65) Hyperglycemia due to diabetes mellitus (H)  Diagnosed with type 2 diabetes through ER visit approximately 2 to 3 years ago.  He has intermittently used medication in the last few years although has not been consistent with medication use.  He most recently was seen in the ER on July 14 and noted to have elevated sugar.  He was restarted on her metformin taper and told to establish care.  He comes in today to establish care.  He currently is on 500 mg metformin daily with plans to increase to twice daily starting tomorrow.  They are unable to  further medication until the 31st so we will have him continue with once daily metformin for a few extra days and then increase to twice a day with plans to increase to max dosing of 2g daily. A1c today of 11.4%. Will plan to have him follow up in 3 months for recheck; sooner with any acute concerns. Diabetes educator referral placed today as well.   Plan: metFORMIN (GLUCOPHAGE) 1000 MG tablet, Adult         Diabetes Education  Referral,         Hemoglobin A1c    (Z13.6) Screening for cardiovascular condition  Plan: Lipid panel reflex to direct LDL Fasting    The longitudinal plan of care for the diagnosis(es)/condition(s) as documented were addressed during this visit. Due to the added complexity in care, I will continue to support Kar in the subsequent management and with ongoing continuity of care.    BMI  Estimated body mass index is 48.26 kg/m  as calculated from the following:    Height as of this encounter: 1.803 m (5' 11\").    Weight as of this encounter: 156.9 kg (346 lb).   Weight management plan: Discussed healthy diet and exercise guidelines    Follow up in 3 months for diabetes recheck; sooner with any acute concerns.    Joann Lakhani is a 30 year old, presenting for the following " "health issues:  Establish Care, Diabetes, and Hospital F/U        7/21/2025    10:47 AM   Additional Questions   Roomed by Cherrie WHATLEY   Accompanied by Diana     [facility]     ED/UC Followup:  Facility:  [facility]   Date of visit: 07/14/2025  Reason for visit: Hyperglycemia due to Diabetes Mellitus   Current Status: feeling a bit better     Diabetes Follow-up  How often are you checking your blood sugar? Three times daily  Blood sugar testing frequency justification:  Patient modifying lifestyle changes (diet, exercise) with blood sugars  What time of day are you checking your blood sugars (select all that apply)?  Before meals  Have you had any blood sugars above 200?  Yes 400  Have you had any blood sugars below 70?  No  What symptoms do you notice when your blood sugar is low?  None  What concerns do you have today about your diabetes? Other: concerned about long term affects    Do you have any of these symptoms? (Select all that apply)  Excessive thirst and Blurry vision  Have you had a diabetic eye exam in the last 12 months? No      BP Readings from Last 2 Encounters:   07/21/25 135/85   07/14/25 (!) 136/95     No results found for: \"A1C\", \"LDL\"    How many servings of fruits and vegetables do you eat daily?  2-3  On average, how many sweetened beverages do you drink each day (Examples: soda, juice, sweet tea, etc.  Do NOT count diet or artificially sweetened beverages)?   0  How many days per week do you exercise enough to make your heart beat faster? 3 or less  How many minutes a day do you exercise enough to make your heart beat faster? 60 or more  How many days per week do you miss taking your medication? 0      Review of Systems  Constitutional, HEENT, cardiovascular, pulmonary, gi and gu systems are negative, except as otherwise noted.        Objective    /85 (BP Location: Right arm, Patient Position: Sitting, Cuff Size: Adult Large)   Pulse 84   Temp 98.1  F (36.7  C) (Oral)   Resp 20 " "  Ht 1.803 m (5' 11\")   Wt (!) 156.9 kg (346 lb)   SpO2 97%   BMI 48.26 kg/m    Body mass index is 48.26 kg/m .  Physical Exam   GENERAL: alert and no distress  EYES: Eyes grossly normal to inspection, conjunctivae and sclerae normal  RESP: no respiratory distress, no audible wheezes  CV: regular rate and rhythm  MS: no gross musculoskeletal defects noted, no edema      Signed Electronically by: Jo-Ann Celis PA-C    Answers submitted by the patient for this visit:  Patient Health Questionnaire (Submitted on 7/21/2025)  If you checked off any problems, how difficult have these problems made it for you to do your work, take care of things at home, or get along with other people?: Not difficult at all  PHQ9 TOTAL SCORE: 0    "

## 2025-07-22 ENCOUNTER — MYC MEDICAL ADVICE (OUTPATIENT)
Dept: FAMILY MEDICINE | Facility: CLINIC | Age: 30
End: 2025-07-22
Payer: MEDICAID

## 2025-07-22 ENCOUNTER — PATIENT OUTREACH (OUTPATIENT)
Dept: CARE COORDINATION | Facility: CLINIC | Age: 30
End: 2025-07-22
Payer: MEDICAID

## 2025-07-24 ENCOUNTER — PATIENT OUTREACH (OUTPATIENT)
Dept: CARE COORDINATION | Facility: CLINIC | Age: 30
End: 2025-07-24
Payer: MEDICAID

## 2025-08-19 ENCOUNTER — HOSPITAL ENCOUNTER (INPATIENT)
Facility: CLINIC | Age: 30
LOS: 1 days | Discharge: HOME | End: 2025-08-19
Admitting: STUDENT IN AN ORGANIZED HEALTH CARE EDUCATION/TRAINING PROGRAM

## 2025-08-19 ENCOUNTER — APPOINTMENT (OUTPATIENT)
Dept: CT IMAGING | Facility: CLINIC | Age: 30
End: 2025-08-19

## 2025-08-19 VITALS
HEART RATE: 90 BPM | TEMPERATURE: 97.7 F | OXYGEN SATURATION: 98 % | DIASTOLIC BLOOD PRESSURE: 92 MMHG | RESPIRATION RATE: 18 BRPM | SYSTOLIC BLOOD PRESSURE: 130 MMHG

## 2025-08-19 DIAGNOSIS — K80.50 BILIARY COLIC: Primary | ICD-10-CM

## 2025-08-19 PROBLEM — K81.9 CHOLECYSTITIS: Status: ACTIVE | Noted: 2025-08-19

## 2025-08-19 LAB
ALBUMIN SERPL BCG-MCNC: 4.2 G/DL (ref 3.5–5.2)
ALP SERPL-CCNC: 91 U/L (ref 40–150)
ALT SERPL W P-5'-P-CCNC: 80 U/L (ref 0–70)
ANION GAP SERPL CALCULATED.3IONS-SCNC: 13 MMOL/L (ref 7–15)
AST SERPL W P-5'-P-CCNC: 39 U/L (ref 0–45)
BASOPHILS # BLD AUTO: <0.03 10E3/UL (ref 0–0.2)
BASOPHILS NFR BLD AUTO: 0.1 %
BILIRUB SERPL-MCNC: 0.9 MG/DL
BUN SERPL-MCNC: 7.5 MG/DL (ref 6–20)
CALCIUM SERPL-MCNC: 9.4 MG/DL (ref 8.8–10.4)
CHLORIDE SERPL-SCNC: 100 MMOL/L (ref 98–107)
CREAT SERPL-MCNC: 0.65 MG/DL (ref 0.67–1.17)
EGFRCR SERPLBLD CKD-EPI 2021: >90 ML/MIN/1.73M2
EOSINOPHIL # BLD AUTO: 0.07 10E3/UL (ref 0–0.7)
EOSINOPHIL NFR BLD AUTO: 0.7 %
ERYTHROCYTE [DISTWIDTH] IN BLOOD BY AUTOMATED COUNT: 12.4 % (ref 10–15)
GLUCOSE SERPL-MCNC: 206 MG/DL (ref 70–99)
HCO3 SERPL-SCNC: 25 MMOL/L (ref 22–29)
HCT VFR BLD AUTO: 39.6 % (ref 40–53)
HGB BLD-MCNC: 13.1 G/DL (ref 13.3–17.7)
HOLD SPECIMEN: NORMAL
IMM GRANULOCYTES # BLD: <0.03 10E3/UL
IMM GRANULOCYTES NFR BLD: 0.1 %
LIPASE SERPL-CCNC: 42 U/L (ref 13–60)
LYMPHOCYTES # BLD AUTO: 1.76 10E3/UL (ref 0.8–5.3)
LYMPHOCYTES NFR BLD AUTO: 17.4 %
MCH RBC QN AUTO: 28.7 PG (ref 26.5–33)
MCHC RBC AUTO-ENTMCNC: 33.1 G/DL (ref 31.5–36.5)
MCV RBC AUTO: 86.7 FL (ref 78–100)
MONOCYTES # BLD AUTO: 0.58 10E3/UL (ref 0–1.3)
MONOCYTES NFR BLD AUTO: 5.7 %
NEUTROPHILS # BLD AUTO: 7.69 10E3/UL (ref 1.6–8.3)
NEUTROPHILS NFR BLD AUTO: 76 %
NRBC # BLD AUTO: <0.03 10E3/UL
NRBC BLD AUTO-RTO: 0 /100
PLATELET # BLD AUTO: 298 10E3/UL (ref 150–450)
POTASSIUM SERPL-SCNC: 3.9 MMOL/L (ref 3.4–5.3)
PROT SERPL-MCNC: 7.4 G/DL (ref 6.4–8.3)
RBC # BLD AUTO: 4.57 10E6/UL (ref 4.4–5.9)
SODIUM SERPL-SCNC: 138 MMOL/L (ref 135–145)
WBC # BLD AUTO: 10.12 10E3/UL (ref 4–11)

## 2025-08-19 PROCEDURE — 96374 THER/PROPH/DIAG INJ IV PUSH: CPT | Mod: 59

## 2025-08-19 PROCEDURE — 250N000009 HC RX 250

## 2025-08-19 PROCEDURE — 250N000011 HC RX IP 250 OP 636

## 2025-08-19 PROCEDURE — 84155 ASSAY OF PROTEIN SERUM: CPT

## 2025-08-19 PROCEDURE — 74177 CT ABD & PELVIS W/CONTRAST: CPT

## 2025-08-19 PROCEDURE — 85025 COMPLETE CBC W/AUTO DIFF WBC: CPT

## 2025-08-19 PROCEDURE — 83690 ASSAY OF LIPASE: CPT

## 2025-08-19 PROCEDURE — 250N000013 HC RX MED GY IP 250 OP 250 PS 637

## 2025-08-19 PROCEDURE — 99221 1ST HOSP IP/OBS SF/LOW 40: CPT | Performed by: SURGERY

## 2025-08-19 PROCEDURE — 36415 COLL VENOUS BLD VENIPUNCTURE: CPT

## 2025-08-19 PROCEDURE — 96361 HYDRATE IV INFUSION ADD-ON: CPT

## 2025-08-19 PROCEDURE — 120N000001 HC R&B MED SURG/OB

## 2025-08-19 PROCEDURE — 99285 EMERGENCY DEPT VISIT HI MDM: CPT | Mod: 25

## 2025-08-19 PROCEDURE — 258N000003 HC RX IP 258 OP 636

## 2025-08-19 RX ORDER — ONDANSETRON 2 MG/ML
4 INJECTION INTRAMUSCULAR; INTRAVENOUS EVERY 30 MIN PRN
Status: DISCONTINUED | OUTPATIENT
Start: 2025-08-19 | End: 2025-08-19 | Stop reason: HOSPADM

## 2025-08-19 RX ORDER — KETOROLAC TROMETHAMINE 15 MG/ML
15 INJECTION, SOLUTION INTRAMUSCULAR; INTRAVENOUS ONCE
Status: COMPLETED | OUTPATIENT
Start: 2025-08-19 | End: 2025-08-19

## 2025-08-19 RX ORDER — ACETAMINOPHEN 500 MG
500-1000 TABLET ORAL EVERY 6 HOURS PRN
COMMUNITY

## 2025-08-19 RX ORDER — MAGNESIUM HYDROXIDE/ALUMINUM HYDROXICE/SIMETHICONE 120; 1200; 1200 MG/30ML; MG/30ML; MG/30ML
15 SUSPENSION ORAL ONCE
Status: COMPLETED | OUTPATIENT
Start: 2025-08-19 | End: 2025-08-19

## 2025-08-19 RX ORDER — INDOCYANINE GREEN AND WATER 25 MG
2.5 KIT INJECTION ONCE
Status: CANCELLED | OUTPATIENT
Start: 2025-08-19 | End: 2025-08-19

## 2025-08-19 RX ORDER — PIPERACILLIN SODIUM, TAZOBACTAM SODIUM 4; .5 G/20ML; G/20ML
4.5 INJECTION, POWDER, LYOPHILIZED, FOR SOLUTION INTRAVENOUS ONCE
Status: COMPLETED | OUTPATIENT
Start: 2025-08-19 | End: 2025-08-19

## 2025-08-19 RX ORDER — CALCIUM CARBONATE 500 MG/1
1 TABLET, CHEWABLE ORAL 2 TIMES DAILY PRN
COMMUNITY

## 2025-08-19 RX ORDER — CEFAZOLIN SODIUM IN 0.9 % NACL 3 G/100 ML
3 INTRAVENOUS SOLUTION, PIGGYBACK (ML) INTRAVENOUS SEE ADMIN INSTRUCTIONS
Status: CANCELLED | OUTPATIENT
Start: 2025-08-19

## 2025-08-19 RX ORDER — CEFAZOLIN SODIUM IN 0.9 % NACL 3 G/100 ML
3 INTRAVENOUS SOLUTION, PIGGYBACK (ML) INTRAVENOUS
Status: CANCELLED | OUTPATIENT
Start: 2025-08-19

## 2025-08-19 RX ORDER — IOPAMIDOL 755 MG/ML
500 INJECTION, SOLUTION INTRAVASCULAR ONCE
Status: COMPLETED | OUTPATIENT
Start: 2025-08-19 | End: 2025-08-19

## 2025-08-19 RX ADMIN — KETOROLAC TROMETHAMINE 15 MG: 15 INJECTION, SOLUTION INTRAMUSCULAR; INTRAVENOUS at 05:19

## 2025-08-19 RX ADMIN — SODIUM CHLORIDE 1000 ML: 0.9 INJECTION, SOLUTION INTRAVENOUS at 05:20

## 2025-08-19 RX ADMIN — ALUMINUM HYDROXIDE, MAGNESIUM HYDROXIDE, AND SIMETHICONE 15 ML: 200; 200; 20 SUSPENSION ORAL at 05:20

## 2025-08-19 RX ADMIN — PIPERACILLIN AND TAZOBACTAM 4.5 G: 4; .5 INJECTION, POWDER, FOR SOLUTION INTRAVENOUS at 06:51

## 2025-08-19 RX ADMIN — IOPAMIDOL 100 ML: 755 INJECTION, SOLUTION INTRAVENOUS at 05:48

## 2025-08-19 RX ADMIN — SODIUM CHLORIDE 60 ML: 9 INJECTION, SOLUTION INTRAVENOUS at 05:48

## 2025-08-19 ASSESSMENT — ACTIVITIES OF DAILY LIVING (ADL)
ADLS_ACUITY_SCORE: 41

## 2025-08-21 ENCOUNTER — PATIENT OUTREACH (OUTPATIENT)
Dept: CARE COORDINATION | Facility: CLINIC | Age: 30
End: 2025-08-21